# Patient Record
Sex: FEMALE | Race: OTHER | HISPANIC OR LATINO | Employment: FULL TIME | URBAN - METROPOLITAN AREA
[De-identification: names, ages, dates, MRNs, and addresses within clinical notes are randomized per-mention and may not be internally consistent; named-entity substitution may affect disease eponyms.]

---

## 2017-04-19 ENCOUNTER — ALLSCRIPTS OFFICE VISIT (OUTPATIENT)
Dept: OTHER | Facility: OTHER | Age: 42
End: 2017-04-19

## 2017-07-11 ENCOUNTER — OFFICE VISIT (OUTPATIENT)
Dept: URGENT CARE | Facility: MEDICAL CENTER | Age: 42
End: 2017-07-11
Payer: COMMERCIAL

## 2017-07-11 PROCEDURE — 99213 OFFICE O/P EST LOW 20 MIN: CPT

## 2017-07-14 ENCOUNTER — GENERIC CONVERSION - ENCOUNTER (OUTPATIENT)
Dept: OTHER | Facility: OTHER | Age: 42
End: 2017-07-14

## 2017-07-19 ENCOUNTER — GENERIC CONVERSION - ENCOUNTER (OUTPATIENT)
Dept: OTHER | Facility: OTHER | Age: 42
End: 2017-07-19

## 2017-08-25 ENCOUNTER — GENERIC CONVERSION - ENCOUNTER (OUTPATIENT)
Dept: OTHER | Facility: OTHER | Age: 42
End: 2017-08-25

## 2017-12-05 ENCOUNTER — ALLSCRIPTS OFFICE VISIT (OUTPATIENT)
Dept: OTHER | Facility: OTHER | Age: 42
End: 2017-12-05

## 2017-12-05 DIAGNOSIS — Z00.00 ENCOUNTER FOR GENERAL ADULT MEDICAL EXAMINATION WITHOUT ABNORMAL FINDINGS: ICD-10-CM

## 2017-12-06 NOTE — PROGRESS NOTES
Assessment    1  Gastritis (535 50) (K29 70)   2  Fever blister (054 9) (B00 1)   3  Encounter for preventive health examination (V70 0) (Z00 00)    Plan  Fever blister    · ValACYclovir HCl - 1 GM Oral Tablet; TAKE 2 TABLETS TWICE DAILY  Gastritis    · Omeprazole 20 MG Oral Capsule Delayed Release; Take one capsule daily  Health Maintenance    · (1) CBC/ PLT (NO DIFF); Status:Active; Requested for:65Ngw5248;    · (1) COMPREHENSIVE METABOLIC PANEL; Status:Active; Requested for:31Qmv8710;    · (1) LIPID PANEL, FASTING; Status:Active; Requested for:04Dmi9523;    · (1) TSH; Status:Active; Requested for:34Tfd7054;    · We recommend you modify your diet to achieve and maintain a healthy weight  Bertis Caul may increase your risk of developing health problems from vitamin andmineral deficiencies  We recommend a balanced diet rich in fruits and vegetables  Saranbrunarupal Hector also consider increasing your calorie intake by eating more frequently or addingnuts, avocados, and low-fat cheese or milk to your meals  Please let us knowif you would like to learn more about your nutrition and calorie needs, and additionaloptions to help you achieve your weight goals ; Status:Complete;   Done: 78Ygf418935:11PM    Discussion/Summary    I recommend diet and exercise  She should continue playing handball but also to some cross training and some weight-bearing exercises  Also recommend calcium and vitamin-D supplements  The patient was counseled regarding instructions for management,-- impressions  Chief Complaint  Patient presents today requesting refills on meds for her cold sores and stomach  No other concerns  Review of Systems   Constitutional: No fever, no chills, feels well, no tiredness, no recent weight gain or weight loss  Eyes: No complaints of eye pain, no red eyes, no eyesight problems, no discharge, no dry eyes, no itching of eyes    ENT: no complaints of earache, no loss of hearing, no nose bleeds, no nasal discharge, no sore throat, no hoarseness  Cardiovascular: No complaints of slow heart rate, no fast heart rate, no chest pain, no palpitations, no leg claudication, no lower extremity edema  Respiratory: No complaints of shortness of breath, no wheezing, no cough, no SOB on exertion, no orthopnea, no PND  Gastrointestinal: No complaints of abdominal pain, no constipation, no nausea or vomiting, no diarrhea, no bloody stools  Genitourinary: No complaints of dysuria, no incontinence, no pelvic pain, no dysmenorrhea, no vaginal discharge or bleeding  Musculoskeletal: No complaints of arthralgias, no myalgias, no joint swelling or stiffness, no limb pain or swelling  Integumentary: No complaints of skin rash or lesions, no itching, no skin wounds, no breast pain or lump  Neurological: No complaints of headache, no confusion, no convulsions, no numbness, no dizziness or fainting, no tingling, no limb weakness, no difficulty walking  Psychiatric: Not suicidal, no sleep disturbance, no anxiety or depression, no change in personality, no emotional problems  Endocrine: No complaints of proptosis, no hot flashes, no muscle weakness, no deepening of the voice, no feelings of weakness  Hematologic/Lymphatic: No complaints of swollen glands, no swollen glands in the neck, does not bleed easily, does not bruise easily  Active Problems  1  Asthma (493 90) (J45 909)   2  Chest pain (786 50) (R07 9)   3  Common migraine without aura (346 10) (G43 009)   4  Contact dermatitis (692 9) (L25 9)   5  Fever blister (054 9) (B00 1)   6  Ganglion cyst of joint of finger of right hand (727 41) (M67 441)   7  Gastritis (535 50) (K29 70)    Past Medical History  1  History of Summary Of Previous Pregnancies  3  (Total No )   2  History of Summary Of Previous Pregnancies Para 3  (Deliveries)   3  Urinary tract infection (599 0) (N39 0)    The active problems and past medical history were reviewed and updated today  Surgical History  1  History of Knee Surgery   2  History of Tubal Ligation    Family History  Father    1  Family history of Diabetes Mellitus (V18 0)  Maternal Grandmother    2  Family history of Breast Cancer (V16 3)  Family History    3  Family history of Asthma, chronic    Social History     · Drinking In Moderation (2 Drinks / Day Or Fewer)   · Marital History -    · Never A Smoker   · Denied: History of Physical Abuse (History)  The social history was reviewed and updated today  Current Meds   1  Diclofenac Sodium 75 MG Oral Tablet Delayed Release; TAKE 1 TABLET Twice daily with food; Therapy: 25Cpn7583 to (Evaluate:63Fdt9987)  Requested for: 64Jfv8775; Last Rx:46Mkl2172 Ordered   2  Omeprazole 20 MG Oral Capsule Delayed Release; Take one capsule daily; Therapy: 84FYG5181 to (Evaluate:39Ahq2792)  Requested for: 57MHU5731; Last Rx:64Dxl0050 Ordered   3  ValACYclovir HCl - 1 GM Oral Tablet; TAKE 2 TABLETS TWICE DAILY; Therapy: 58Lln4486 to (Evaluate:54Crq8805)  Requested for: 54Nuh0040; Last Rx:82Rxv9566 Ordered    The medication list was reviewed and updated today  Allergies  1  No Known Drug Allergies  2  Seasonal    Vitals  Vital Signs    Recorded: 10RKO4801 02:49PM   Temperature 98 4 F, Tympanic   Systolic 253, RUE, Sitting   Diastolic 74, RUE, Sitting   Height 5 ft 5 in   Weight 180 lb    BMI Calculated 29 95   BSA Calculated 1 89       Physical Exam   Constitutional  General appearance: No acute distress, well appearing and well nourished  Eyes  Conjunctiva and lids: No swelling, erythema or discharge  Pupils and irises: Equal, round and reactive to light  Ears, Nose, Mouth, and Throat  External inspection of ears and nose: Normal    Otoscopic examination: Tympanic membranes translucent with normal light reflex  Canals patent without erythema  Nasal mucosa, septum, and turbinates: Normal without edema or erythema     Oropharynx: Normal with no erythema, edema, exudate or lesions  Pulmonary  Respiratory effort: No increased work of breathing or signs of respiratory distress  Auscultation of lungs: Clear to auscultation  Cardiovascular  Palpation of heart: Normal PMI, no thrills  Auscultation of heart: Normal rate and rhythm, normal S1 and S2, without murmurs  Examination of extremities for edema and/or varicosities: Normal    Carotid pulses: Normal    Abdomen  Abdomen: Non-tender, no masses  Liver and spleen: No hepatomegaly or splenomegaly  Lymphatic  Palpation of lymph nodes in neck: No lymphadenopathy  Musculoskeletal  Gait and station: Normal    Digits and nails: Normal without clubbing or cyanosis  Inspection/palpation of joints, bones, and muscles: Normal    Skin  Skin and subcutaneous tissue: Normal without rashes or lesions  Neurologic  Cranial nerves: Cranial nerves 2-12 intact  Reflexes: 2+ and symmetric  Sensation: No sensory loss  Psychiatric  Orientation to person, place, and time: Normal    Mood and affect: Normal          Health Management  Encounter for preventive health examination   BREAST EXAM; every 1 year; Next Due: 12Jan2015; Overdue  PELVIC EXAM; every 1 year; Next Due: 12Jan2015;  Overdue    Signatures   Electronically signed by : Carol Graves DO; Dec  5 2017  3:12PM EST                       (Author)

## 2018-01-11 ENCOUNTER — ALLSCRIPTS OFFICE VISIT (OUTPATIENT)
Dept: OTHER | Facility: OTHER | Age: 43
End: 2018-01-11

## 2018-01-11 NOTE — PROGRESS NOTES
Assessment    1  Urinary tract infection (599 0) (N39 0)    Plan  Urinary tract infection    · Ciprofloxacin HCl - 250 MG Oral Tablet; one table twice daily   · Drink at least 6 glasses of clear liquids a day ; Status:Complete;   Done: 40CFY4343  11:06AM   · Call [phone] if: You have pain in the kidney or low back area ; Status:Complete;    Done: 61MDH2349 11:06AM   · (1) URINALYSIS (will reflex a microscopy if leukocytes, occult blood, protein or nitrites  are not within normal limits); Status:Active; Requested for:22Jan2016;    · (1) URINE CULTURE; Source:Urine, Clean Catch; Status:Active; Requested  for:22Jan2016;   UTI symptoms    · Follow Up if Not Better Evaluation and Treatment  Follow-up  Status: Complete  Done:  16JHW6616 11:04AM    Chief Complaint  PT PRESENTS FOR POSS UTI  PT STATES THERE IS BURNING, ODER, AND A PULLING FEELING WHEN VOIDING  History of Present Illness  HPI: Patient is here suprapubic discomfort, urine odor and dysuria over the past 3 days  No hematuria or back pain associated with this  Patient with some nausea but no vomiting  No fever noted  No medications used  Last menstrual cycle January 4 which was normal       Review of Systems    Constitutional: No fever, no chills, feels well, no tiredness, no recent weight gain or loss  ENT: no ear ache, no loss of hearing, no nosebleeds or nasal discharge, no sore throat or hoarseness  Cardiovascular: no complaints of slow or fast heart rate, no chest pain, no palpitations, no leg claudication or lower extremity edema  Respiratory: no complaints of shortness of breath, no wheezing, no dyspnea on exertion, no orthopnea or PND  Breasts: no complaints of breast pain, breast lump or nipple discharge  Gastrointestinal: no complaints of abdominal pain, no constipation, no nausea or diarrhea, no vomiting, no bloody stools  Genitourinary: as noted in HPI     Musculoskeletal: no complaints of arthralgia, no myalgia, no joint swelling or stiffness, no limb pain or swelling  Integumentary: no complaints of skin rash or lesion, no itching or dry skin, no skin wounds  Neurological: no complaints of headache, no confusion, no numbness or tingling, no dizziness or fainting  Active Problems    1  Asthma (493 90) (J45 909)   2  Backache (724 5) (M54 9)   3  Chest pain (786 50) (R07 9)   4  Common migraine without aura (346 10) (G43 009)   5  Dysfunctional uterine bleeding (626 8) (N93 8)   6  Encounter for routine gynecological examination with Papanicolaou smear of cervix   (V72 31,V76 2) (Z01 419,Z12 4)   7  Encounter for screening for malignant neoplasm of cervix (V76 2) (Z12 4)   8  Gastritis (535 50) (K29 70)   9  Heart burn (787 1) (R12)   10  Lumbar Sprain (847 2)   11  Lumbar Strain (847 2)   12  Motion sickness (994 6) (T75 3XXA)   13  Near syncope (780 2) (R55)   14  Somatic dysfunction of lumbar region (739 3) (M99 03)   15  Trapezius muscle spasm (728 85) (E71 793)    Past Medical History    1  History of Summary Of Previous Pregnancies  3  (Total No )   2  History of Summary Of Previous Pregnancies Para 3  (Deliveries)    Family History    1  Family history of Diabetes Mellitus (V18 0)    2  Family history of Breast Cancer (V16 3)    3  Family history of Asthma, chronic    Social History    · Drinking In Moderation (2 Drinks / Day Or Fewer)   · Marital History -    · Never A Smoker   · Denied: History of Physical Abuse (History)    Surgical History    1  History of Knee Surgery   2  History of Knee Surgery   3  History of Tubal Ligation    Current Meds   1  Cyclobenzaprine HCl - 5 MG Oral Tablet; TAKE 1 TABLET 3 times daily; Therapy: 67Rfl2555 to (Evaluate:2014)  Requested for: 2014; Last   Rx:2014 Ordered   2  Naproxen TABS; Therapy: (Recorded:2015) to Recorded   3  Omeprazole 20 MG Oral Capsule Delayed Release; Take one capsule daily;    Therapy: 50UZW8233 to (Evaluate:2016) Requested for: 12Jan2015; Last   Rx:12Jan2015 Ordered   4  PredniSONE 50 MG Oral Tablet; TAKE 1 TABLET DAILY WITH FOOD; Therapy: 06Grs8073 to (Evaluate:08Sep2014)  Requested for: 48Joa2363; Last   Rx:03Sep2014 Ordered   5  ProAir  (90 Base) MCG/ACT Inhalation Aerosol Solution; INHALE 1 TO 2 PUFFS   EVERY 4 TO 6 HOURS AS NEEDED; Therapy: 89TQR7112 to (Evaluate:12Apr2015)  Requested for: 53XTR0162; Last   Rx:12Jan2015 Ordered    Allergies    1  No Known Drug Allergies    2  Seasonal    Vitals   Recorded: 96YGR3448 10:54AM   Temperature 41 8 F    Systolic 032, LUE, Sitting    Diastolic 60, LUE, Sitting    Patient Refused Weight No No     Physical Exam    Constitutional   General appearance: No acute distress, well appearing and well nourished  Eyes   Conjunctiva and lids: No swelling, erythema or discharge  Pupils and irises: Equal, round and reactive to light  Ears, Nose, Mouth, and Throat   External inspection of ears and nose: Normal     Otoscopic examination: Tympanic membranes translucent with normal light reflex  Canals patent without erythema  Nasal mucosa, septum, and turbinates: Normal without edema or erythema  Oropharynx: Normal with no erythema, edema, exudate or lesions  Pulmonary   Respiratory effort: No increased work of breathing or signs of respiratory distress  Auscultation of lungs: Clear to auscultation  Cardiovascular   Palpation of heart: Normal PMI, no thrills  Auscultation of heart: Normal rate and rhythm, normal S1 and S2, without murmurs  Examination of extremities for edema and/or varicosities: Normal     Carotid pulses: Normal     Abdomen   Abdomen: Abnormal   Mild suprapubic discomfort with palpation  Abdomen otherwise soft and nontender  Bowel sounds present  No back pain  Liver and spleen: No hepatomegaly or splenomegaly  Lymphatic   Palpation of lymph nodes in neck: No lymphadenopathy      Musculoskeletal   Gait and station: Normal     Digits and nails: Normal without clubbing or cyanosis  Inspection/palpation of joints, bones, and muscles: Normal     Skin   Skin and subcutaneous tissue: Normal without rashes or lesions  Neurologic   Cranial nerves: Cranial nerves 2-12 intact  Reflexes: 2+ and symmetric  Sensation: No sensory loss      Psychiatric   Orientation to person, place, and time: Normal     Mood and affect: Normal          Results/Data  Urine Dip Non-Automated- POC 68GSA8895 11:02AM [facility]     Test Name Result Flag Reference   Color Yellow     Clarity Cloudy     Leukocytes +     Nitrite NEG     Blood NEG     Bilirubin NEG     Urobilinogen NEG     Protein +30     Ph 5     Specific Gravity 1 010     Ketone NEG     Glucose NORMAL         Signatures   Electronically signed by : Sharee Peoples DO; Jan 22 2016 11:08AM EST                       (Author)

## 2018-01-12 NOTE — PROGRESS NOTES
Assessment   1  Asthma (493 90) (J45 909)   2  History of chest pain (V13 89) (Z87 898)   3  Fever blister (054 9) (B00 1)   4  History of Common migraine without aura (346 10) (G43 009)    Discussion/Summary      Continue current therapy  I will call with the lab test results  Follow-up in 6 months or as needed  The patient was counseled regarding instructions for management,-- impressions,-- importance of compliance with treatment  Chief Complaint   PT here for follow up for medication check  PT is overdue for OBGYN  PT reports no signs of depression  History of Present Illness   She is feeling well  She started diet and exercise and and working out  Review of Systems        Constitutional: No fever, no chills, feels well, no tiredness, no recent weight gain or weight loss  Eyes: No complaints of eye pain, no red eyes, no eyesight problems, no discharge, no dry eyes, no itching of eyes  ENT: no complaints of earache, no loss of hearing, no nose bleeds, no nasal discharge, no sore throat, no hoarseness  Cardiovascular: No complaints of slow heart rate, no fast heart rate, no chest pain, no palpitations, no leg claudication, no lower extremity edema  Respiratory: No complaints of shortness of breath, no wheezing, no cough, no SOB on exertion, no orthopnea, no PND  Gastrointestinal: No complaints of abdominal pain, no constipation, no nausea or vomiting, no diarrhea, no bloody stools  Genitourinary: No complaints of dysuria, no incontinence, no pelvic pain, no dysmenorrhea, no vaginal discharge or bleeding  Musculoskeletal: No complaints of arthralgias, no myalgias, no joint swelling or stiffness, no limb pain or swelling  Integumentary: No complaints of skin rash or lesions, no itching, no skin wounds, no breast pain or lump        Neurological: No complaints of headache, no confusion, no convulsions, no numbness, no dizziness or fainting, no tingling, no limb weakness, no difficulty walking  Psychiatric: Not suicidal, no sleep disturbance, no anxiety or depression, no change in personality, no emotional problems  Endocrine: No complaints of proptosis, no hot flashes, no muscle weakness, no deepening of the voice, no feelings of weakness  Hematologic/Lymphatic: No complaints of swollen glands, no swollen glands in the neck, does not bleed easily, does not bruise easily  Active Problems   1  Asthma (493 90) (J45 909)   2  Fever blister (054 9) (B00 1)    Past Medical History   1  History of Summary Of Previous Pregnancies  3  (Total No )   2  History of Summary Of Previous Pregnancies Para 3  (Deliveries)   3  Urinary tract infection (599 0) (N39 0)     The active problems and past medical history were reviewed and updated today  Surgical History   1  History of Knee Surgery   2  History of Tubal Ligation     The surgical history was reviewed and updated today  Family History   Father    1  Family history of Diabetes Mellitus (V18 0)  Maternal Grandmother    2  Family history of Breast Cancer (V16 3)  Family History    3  Family history of Asthma, chronic     The family history was reviewed and updated today  Social History    · Drinking In Moderation (2 Drinks / Day Or Fewer)   · Marital History -    · Never A Smoker   · Denied: History of Physical Abuse (History)  The social history was reviewed and updated today  Current Meds    1  Diclofenac Sodium 75 MG Oral Tablet Delayed Release; TAKE 1 TABLET Twice daily with     food; Therapy: 84Pfz4353 to (Evaluate:41Xjm0000)  Requested for: 2017; Last     Rx:2017 Ordered   2  Omeprazole 20 MG Oral Capsule Delayed Release; Take one capsule daily; Therapy: 94NEC0614 to (Xavi Alcantara)  Requested for: 29JGR9597; Last     Rx:35Ffr0255 Ordered   3  ValACYclovir HCl - 1 GM Oral Tablet; TAKE 2 TABLETS TWICE DAILY;      Therapy: 61Iim6235 to (Evaluate:13Lrt7941)  Requested for: 55YUG2655; Last     Rx:15Idh6558 Ordered     The medication list was reviewed and updated today  Allergies   1  No Known Drug Allergies  2  Seasonal    Vitals   Vital Signs    Recorded: 44GOG5806 10:04AM   Heart Rate 72, R Radial   Respiration 16   Systolic 431, LUE, Sitting   Diastolic 68, LUE, Sitting   Height 5 ft 5 in   Weight 175 lb    BMI Calculated 29 12   BSA Calculated 1 87     Physical Exam        Constitutional      General appearance: No acute distress, well appearing and well nourished  Eyes      Conjunctiva and lids: No swelling, erythema or discharge  Pupils and irises: Equal, round and reactive to light  Ears, Nose, Mouth, and Throat      External inspection of ears and nose: Normal        Otoscopic examination: Tympanic membranes translucent with normal light reflex  Canals patent without erythema  Nasal mucosa, septum, and turbinates: Normal without edema or erythema  Oropharynx: Normal with no erythema, edema, exudate or lesions  Pulmonary      Respiratory effort: No increased work of breathing or signs of respiratory distress  Auscultation of lungs: Clear to auscultation  Cardiovascular      Palpation of heart: Normal PMI, no thrills  Auscultation of heart: Normal rate and rhythm, normal S1 and S2, without murmurs  Examination of extremities for edema and/or varicosities: Normal        Carotid pulses: Normal        Abdomen      Abdomen: Non-tender, no masses  Liver and spleen: No hepatomegaly or splenomegaly  Lymphatic      Palpation of lymph nodes in neck: No lymphadenopathy  Musculoskeletal      Gait and station: Normal        Digits and nails: Normal without clubbing or cyanosis  Inspection/palpation of joints, bones, and muscles: Normal        Skin      Skin and subcutaneous tissue: Normal without rashes or lesions         Neurologic      Cranial nerves: Cranial nerves 2-12 intact  Reflexes: 2+ and symmetric  Sensation: No sensory loss  Psychiatric      Orientation to person, place, and time: Normal        Mood and affect: Normal           Results/Data   PHQ-2 Adult Depression Screening 18EEI9942 10:10AM User, [facility]      Test Name Result Flag Reference   PHQ-2 Adult Depression Score 0     Over the last two weeks, how often have you been bothered by any of the following problems? Little interest or pleasure in doing things: Not at all - 0     Feeling down, depressed, or hopeless: Not at all - 0   PHQ-2 Adult Depression Screening Negative          Health Management   Encounter for preventive health examination   BREAST EXAM; every 1 year; Next Due: 12Jan2015; Overdue  PELVIC EXAM; every 1 year; Next Due: 12Jan2015;  Overdue    Signatures    Electronically signed by : Erica Valdes DO; Jan 11 2018 10:20AM EST                       (Author)

## 2018-01-14 VITALS
WEIGHT: 172.25 LBS | HEIGHT: 65 IN | BODY MASS INDEX: 28.7 KG/M2 | DIASTOLIC BLOOD PRESSURE: 70 MMHG | SYSTOLIC BLOOD PRESSURE: 140 MMHG

## 2018-01-22 VITALS
WEIGHT: 175 LBS | SYSTOLIC BLOOD PRESSURE: 120 MMHG | HEART RATE: 72 BPM | BODY MASS INDEX: 29.16 KG/M2 | DIASTOLIC BLOOD PRESSURE: 68 MMHG | RESPIRATION RATE: 16 BRPM | HEIGHT: 65 IN

## 2018-01-22 DIAGNOSIS — Z12.31 ENCOUNTER FOR SCREENING MAMMOGRAM FOR MALIGNANT NEOPLASM OF BREAST: ICD-10-CM

## 2018-01-23 ENCOUNTER — LAB REQUISITION (OUTPATIENT)
Dept: LAB | Facility: HOSPITAL | Age: 43
End: 2018-01-23
Payer: COMMERCIAL

## 2018-01-23 ENCOUNTER — ALLSCRIPTS OFFICE VISIT (OUTPATIENT)
Dept: OTHER | Facility: OTHER | Age: 43
End: 2018-01-23

## 2018-01-23 VITALS
WEIGHT: 180 LBS | BODY MASS INDEX: 29.99 KG/M2 | SYSTOLIC BLOOD PRESSURE: 120 MMHG | HEIGHT: 65 IN | DIASTOLIC BLOOD PRESSURE: 74 MMHG | TEMPERATURE: 98.4 F

## 2018-01-23 DIAGNOSIS — Z01.419 ENCOUNTER FOR GYNECOLOGICAL EXAMINATION WITHOUT ABNORMAL FINDING: ICD-10-CM

## 2018-01-23 PROCEDURE — G0145 SCR C/V CYTO,THINLAYER,RESCR: HCPCS | Performed by: NURSE PRACTITIONER

## 2018-01-23 PROCEDURE — 87624 HPV HI-RISK TYP POOLED RSLT: CPT | Performed by: NURSE PRACTITIONER

## 2018-01-24 NOTE — PROGRESS NOTES
Assessment   1  Encounter for gynecological examination with Papanicolaou smear of cervix (V72 31)     (Z01 419)    Plan   Encounter for gynecological examination with Papanicolaou smear of cervix    · (1) THIN PREP PAP WITH IMAGING; Status: In Progress - Specimen/Data    Collected,Retrospective Authorization;   Done: 01AFR4411   Perform:[facility] In Office Collection; Order Comments:cervical and endocervical pap smearCotest HPV regardless genotype 16 and 18; DUY:[id number]; Last Updated Gudelia Tai; 1/23/2018 12:05:51 PM;Ordered;For:Encounter for gynecological examination with Papanicolaou smear of cervix; Ordered By:Sameer Huerta; Maturation index required? : No  HPV? : Regardless of Interpretation   · Follow-up visit in 1 year Evaluation and Treatment  Follow-up  Status: Hold For -    Scheduling  Requested for: 66JSJ0094   Ordered; For: Encounter for gynecological examination with Papanicolaou smear of cervix; Ordered By: Jessa Epstein Performed:  Due: 30JJH7135  Encounter for screening mammogram for malignant neoplasm of breast    · MAMMO SCREENING BILATERAL W 3D & CAD; Status:Hold For - Scheduling; Requested    for:22Jan2018; Perform:[facility]; SSQ:[id number];GSOPTLA;WJY:IJHDVITQV for screening mammogram for malignant neoplasm of breast; Ordered By:Sweta Huerta;    Discussion/Summary   healthy adult female Currently, she eats an adequate diet and has an adequate exercise regimen  the risks and benefits of cervical cancer screening were discussed HPV and Pap Co-testing Done Today Breast cancer screening: the risks and benefits of breast cancer screening were discussed, self breast exam technique was taught, monthly self breast exam was advised, mammogram has been ordered and The pt  is aqware that MMG screening should begin at age 36  Colorectal cancer screening: colorectal cancer screening is not indicated  Osteoporosis screening: bone mineral density testing is not indicated  Advice and education were given regarding nutrition, weight bearing exercise, reproductive health and contraception  The patient has the current Goals: To remain active and healthy  The patent has the current Barriers: None  Patient is able to Self-Care  Chief Complaint   Patient presents today for her yearly exam       History of Present Illness   HPI: The pt  denies having any current GYN problems  GYN HM, Adult Female [facility]: The patient is being seen for a health maintenance and gynecology evaluation  The last health maintenance visit was 22 month(s) ago  General Health: The patient's health since the last visit is described as good  Lifestyle:  She consumes a diverse and healthy diet  -- She exercises regularly  -- She does not use tobacco       Reproductive health:  she reports no menstrual problems  Menstrual history: The cycles have been regular  The duration of her recent periods has been regular  -- she uses contraception  For contraception, she has had a tubal occlusion  -- she is sexually active  Screening: cancer screening reviewed and current  Review of Systems        Constitutional: No fever, no chills, feels well, no tiredness, no recent weight gain or loss  Cardiovascular: no complaints of slow or fast heart rate, no chest pain, no palpitations, no leg claudication or lower extremity edema  Respiratory: no complaints of shortness of breath, no wheezing, no dyspnea on exertion, no orthopnea or PND  Breasts: no complaints of breast pain, breast lump or nipple discharge  Gastrointestinal: no complaints of abdominal pain, no constipation, no nausea or diarrhea, no vomiting, no bloody stools  Genitourinary: no complaints of dysuria, no incontinence, no pelvic pain, no dysmenorrhea, no vaginal discharge or abnormal vaginal bleeding  Integumentary: no complaints of skin rash or lesion, no itching or dry skin, no skin wounds        Neurological: no complaints of headache, no confusion, no numbness or tingling, no dizziness or fainting  Active Problems   1  Asthma (493 90) (J45 909)   2  Encounter for gynecological examination with Papanicolaou smear of cervix (V72 31)     (Z01 419)   3  Encounter for screening mammogram for malignant neoplasm of breast (V76 12)     (Z12 31)   4  Fever blister (054 9) (B00 1)    Past Medical History    · History of Summary Of Previous Pregnancies  3  (Total No )   · History of Summary Of Previous Pregnancies Para 3  (Deliveries)   · Urinary tract infection (599 0) (N39 0)     The active problems and past medical history were reviewed and updated today  Surgical History    · History of Knee Surgery   · History of Tubal Ligation     The surgical history was reviewed and updated today  Family History   Father    · Family history of Diabetes Mellitus (V18 0)  Maternal Grandmother    · Family history of Breast Cancer (V16 3)  Family History    · Family history of Asthma, chronic     The family history was reviewed and updated today  Social History    · Drinking In Moderation (2 Drinks / Day Or Fewer)   · Marital History -    · Never A Smoker   · Denied: History of Physical Abuse (History)  The social history was reviewed and is unchanged  Current Meds    1  Diclofenac Sodium 75 MG Oral Tablet Delayed Release; TAKE 1 TABLET Twice daily with     food; Therapy: 39Bqk1377 to (Evaluate:65Phk4974)  Requested for: 17Kds9886; Last     Rx:[id number] Ordered   2  Omeprazole 20 MG Oral Capsule Delayed Release; Take one capsule daily; Therapy: 40RTL4669 to (Denise Pride)  Requested for: 48GFB7919; Last     Rx:48Hmq8402 Ordered   3  ValACYclovir HCl - 1 GM Oral Tablet; TAKE 2 TABLETS TWICE DAILY; Therapy: 59Qrl0708 to (Evaluate:78Sdp0918)  Requested for: 98BWI7362; Last     Rx:21Ngi4329 Ordered    Allergies   1  No Known Drug Allergies  2   Seasonal    Vitals    Recorded: 26ROF7640 11: 92PC   Systolic 693   Diastolic 66   Height 5 ft 5 in   Weight 178 lb    BMI Calculated 29 62   BSA Calculated 1 88   LMP [id number]     Physical Exam        Constitutional      General appearance: No acute distress, well appearing and well nourished  Neck      Neck: Normal, supple, trachea midline, no masses  Thyroid: Normal, no thyromegaly  Pulmonary      Respiratory effort: No increased work of breathing or signs of respiratory distress  Auscultation of lungs: Clear to auscultation  Cardiovascular      Auscultation of heart: Normal rate and rhythm, normal S1 and S2, no murmurs  Genitourinary      External genitalia: Normal and no lesions appreciated  Vagina: Normal, no lesions or dryness appreciated  Urethra: Normal        Urethral meatus: Normal        Bladder: Normal, soft, non-tender and no prolapse or masses appreciated  Cervix: Normal, no palpable masses  A Pap smear was performed  Uterus: Normal, non-tender, not enlarged, and no palpable masses  Adnexa/parametria: Normal, non-tender and no fullness or masses appreciated  Chest      Breasts: Normal and no dimpling or skin changes noted  Abdomen      Abdomen: Normal, non-tender, and no organomegaly noted  Liver and spleen: No hepatomegaly or splenomegaly  Examination for hernias: No hernias appreciated  Lymphatic      Palpation of lymph nodes in neck, axillae, groin and/or other locations: No lymphadenopathy or masses noted  Skin      Skin and subcutaneous tissue: Normal skin turgor and no rashes         Psychiatric      Orientation to person, place, and time: Normal        Mood and affect: Normal        Signatures    Electronically signed by : Марина Ocampo; Jan 23 2018  2:28PM EST                       (Author)     Electronically signed by : Mynor Yancey DO; Jan 23 2018  6:59PM EST

## 2018-01-26 ENCOUNTER — OFFICE VISIT (OUTPATIENT)
Dept: FAMILY MEDICINE CLINIC | Facility: CLINIC | Age: 43
End: 2018-01-26
Payer: COMMERCIAL

## 2018-01-26 VITALS
WEIGHT: 175 LBS | SYSTOLIC BLOOD PRESSURE: 130 MMHG | BODY MASS INDEX: 28.12 KG/M2 | DIASTOLIC BLOOD PRESSURE: 80 MMHG | TEMPERATURE: 99 F | HEIGHT: 66 IN

## 2018-01-26 DIAGNOSIS — J01.00 ACUTE NON-RECURRENT MAXILLARY SINUSITIS: Primary | ICD-10-CM

## 2018-01-26 LAB — HPV RRNA GENITAL QL NAA+PROBE: NORMAL

## 2018-01-26 PROCEDURE — 99213 OFFICE O/P EST LOW 20 MIN: CPT | Performed by: FAMILY MEDICINE

## 2018-01-26 RX ORDER — FLUTICASONE PROPIONATE 50 MCG
1 SPRAY, SUSPENSION (ML) NASAL 2 TIMES DAILY
Qty: 16 G | Refills: 0 | Status: SHIPPED | OUTPATIENT
Start: 2018-01-26 | End: 2018-06-21 | Stop reason: SDUPTHER

## 2018-01-26 RX ORDER — DICLOFENAC SODIUM 75 MG/1
1 TABLET, DELAYED RELEASE ORAL AS NEEDED
COMMUNITY
Start: 2017-04-19 | End: 2019-01-16 | Stop reason: ALTCHOICE

## 2018-01-26 RX ORDER — AMOXICILLIN 500 MG/1
500 CAPSULE ORAL EVERY 8 HOURS SCHEDULED
Qty: 30 CAPSULE | Refills: 0 | Status: SHIPPED | OUTPATIENT
Start: 2018-01-26 | End: 2018-02-05

## 2018-01-26 RX ORDER — VALACYCLOVIR HYDROCHLORIDE 1 G/1
2 TABLET, FILM COATED ORAL AS NEEDED
COMMUNITY
Start: 2016-08-29 | End: 2019-01-31 | Stop reason: SDUPTHER

## 2018-01-26 RX ORDER — OMEPRAZOLE 20 MG/1
1 CAPSULE, DELAYED RELEASE ORAL DAILY
COMMUNITY
Start: 2015-01-12 | End: 2019-08-06 | Stop reason: SDUPTHER

## 2018-01-26 NOTE — PROGRESS NOTES
Assessment/Plan:    No problem-specific Assessment & Plan notes found for this encounter  Diagnoses and all orders for this visit:    Acute non-recurrent maxillary sinusitis  -     amoxicillin (AMOXIL) 500 mg capsule; Take 1 capsule (500 mg total) by mouth every 8 (eight) hours for 10 days  -     fluticasone (FLONASE) 50 mcg/act nasal spray; 1 spray into each nostril 2 (two) times a day          Subjective:   Chief Complaint   Patient presents with    Cold Like Symptoms     congestion, sneezing, cough, pnd, chills x 3-4 days  tried nyquil/dayquil  headache and occasional dizziness  Teeth on top and rt side of jaw painful  Fever was present but broke yesterday  Patient ID: Catrachita Oneal is a 38 y o  female  Patient is here with cough, congestion, postnasal drip, right maxillary teeth hurting over the past 4 days  Patient also with sneezing and chills  Patient did have a fever which broke  Patient also with headache and dizziness  Patient did try NyQuil and DayQuil  The following portions of the patient's history were reviewed and updated as appropriate: allergies, current medications, past family history, past medical history, past social history, past surgical history and problem list     Review of Systems   Constitutional: Negative  HENT: Positive for congestion, ear pain, postnasal drip, rhinorrhea, sinus pain, sinus pressure and sneezing  Negative for facial swelling and sore throat  Eyes: Negative  Respiratory: Negative  Cardiovascular: Negative  Gastrointestinal: Negative  Endocrine: Negative  Genitourinary: Negative  Musculoskeletal: Negative  Skin: Negative  Allergic/Immunologic: Negative  Neurological: Negative  Hematological: Negative  Psychiatric/Behavioral: Negative  Objective:     Physical Exam   Constitutional: She is oriented to person, place, and time  She appears well-developed and well-nourished  No distress     HENT:   Head: Normocephalic and atraumatic  Right Ear: External ear normal    Left Ear: External ear normal    Eyes: Conjunctivae and EOM are normal  Pupils are equal, round, and reactive to light  Right eye exhibits no discharge  Left eye exhibits no discharge  No scleral icterus  Neck: Normal range of motion  Neck supple  No thyromegaly present  Cardiovascular: Normal rate, regular rhythm, normal heart sounds and intact distal pulses  Exam reveals no gallop and no friction rub  No murmur heard  Pulmonary/Chest: Effort normal and breath sounds normal  No respiratory distress  She has no wheezes  She has no rales  She exhibits no tenderness  Abdominal: Soft  Bowel sounds are normal  She exhibits no distension  There is no tenderness  There is no rebound and no guarding  Musculoskeletal: Normal range of motion  She exhibits no edema or tenderness  Lymphadenopathy:     She has no cervical adenopathy  Neurological: She is oriented to person, place, and time  No cranial nerve deficit  She exhibits normal muscle tone  Coordination normal    Skin: Skin is warm and dry  No rash noted  She is not diaphoretic  No erythema  No pallor  Psychiatric: She has a normal mood and affect  Her behavior is normal  Judgment and thought content normal    Nursing note and vitals reviewed

## 2018-01-29 LAB
LAB AP GYN PRIMARY INTERPRETATION: NORMAL
Lab: NORMAL

## 2018-02-13 ENCOUNTER — HOSPITAL ENCOUNTER (OUTPATIENT)
Dept: MAMMOGRAPHY | Facility: MEDICAL CENTER | Age: 43
Discharge: HOME | End: 2018-02-13
Payer: COMMERCIAL

## 2018-02-13 DIAGNOSIS — Z12.31 ENCOUNTER FOR SCREENING MAMMOGRAM FOR MALIGNANT NEOPLASM OF BREAST: ICD-10-CM

## 2018-02-13 PROCEDURE — 77067 SCR MAMMO BI INCL CAD: CPT

## 2018-02-13 PROCEDURE — 77063 BREAST TOMOSYNTHESIS BI: CPT

## 2018-02-26 NOTE — PROGRESS NOTES
Assessment    1  Asthma (493 90) (J45 909)   2  History of chest pain (V13 89) (Z87 898)   3  Fever blister (054 9) (B00 1)   4  History of Common migraine without aura (346 10) (G43 009)   5  Encounter for preventive health examination (V70 0) (Z00 00)    Plan  Health Maintenance    · Call [phone] if: You find a new or different kind of lump in your breast ;  Status:Complete;   Done: 13ORU2210 10:25AM   · We encourage all of our patients to exercise regularly  30 minutes of exercise or physical  activity five or more days a week is recommended for children and adults ;  Status:Complete;   Done: 12MFC9676 10:25AM    Discussion/Summary  health maintenance visit      Chief Complaint  PHYS  History of Present Illness  HM, Adult Female: The patient is being seen for a health maintenance evaluation  General Health: The patient's health since the last visit is described as good  Lifestyle:  She consumes a diverse and healthy diet  She has weight concerns  She exercises regularly  She does not use tobacco  She denies alcohol use  She denies drug use  Screening:      Review of Systems    Constitutional: No fever, no chills, feels well, no tiredness, no recent weight gain or weight loss  Eyes: No complaints of eye pain, no red eyes, no eyesight problems, no discharge, no dry eyes, no itching of eyes  ENT: no complaints of earache, no loss of hearing, no nose bleeds, no nasal discharge, no sore throat, no hoarseness  Cardiovascular: No complaints of slow heart rate, no fast heart rate, no chest pain, no palpitations, no leg claudication, no lower extremity edema  Respiratory: No complaints of shortness of breath, no wheezing, no cough, no SOB on exertion, no orthopnea, no PND  Gastrointestinal: No complaints of abdominal pain, no constipation, no nausea or vomiting, no diarrhea, no bloody stools     Genitourinary: No complaints of dysuria, no incontinence, no pelvic pain, no dysmenorrhea, no vaginal discharge or bleeding  Musculoskeletal: No complaints of arthralgias, no myalgias, no joint swelling or stiffness, no limb pain or swelling  Integumentary: No complaints of skin rash or lesions, no itching, no skin wounds, no breast pain or lump  Neurological: No complaints of headache, no confusion, no convulsions, no numbness, no dizziness or fainting, no tingling, no limb weakness, no difficulty walking  Psychiatric: Not suicidal, no sleep disturbance, no anxiety or depression, no change in personality, no emotional problems  Endocrine: No complaints of proptosis, no hot flashes, no muscle weakness, no deepening of the voice, no feelings of weakness  Hematologic/Lymphatic: No complaints of swollen glands, no swollen glands in the neck, does not bleed easily, does not bruise easily  Active Problems    1  Asthma (493 90) (J45 909)   2  Fever blister (054 9) (B00 1)    Past Medical History    · History of Summary Of Previous Pregnancies  3  (Total No )   · History of Summary Of Previous Pregnancies Para 3  (Deliveries)   · Urinary tract infection (599 0) (N39 0)    Surgical History    · History of Knee Surgery   · History of Tubal Ligation    Family History  Father    · Family history of Diabetes Mellitus (V18 0)  Maternal Grandmother    · Family history of Breast Cancer (V16 3)  Family History    · Family history of Asthma, chronic    Social History    · Drinking In Moderation (2 Drinks / Day Or Fewer)   · Marital History -    · Never A Smoker   · Denied: History of Physical Abuse (History)    Current Meds   1  Diclofenac Sodium 75 MG Oral Tablet Delayed Release; TAKE 1 TABLET Twice daily with   food; Therapy: 60Usr8085 to (Evaluate:70Abm8870)  Requested for: 2017; Last   Rx:2017 Ordered   2  Omeprazole 20 MG Oral Capsule Delayed Release; Take one capsule daily;    Therapy: 49JML0227 to (Ashley Lara)  Requested for: 06OKE5458; Last   Rx:42Dmy1326 Ordered 3  ValACYclovir HCl - 1 GM Oral Tablet; TAKE 2 TABLETS TWICE DAILY; Therapy: 03Bqj1136 to (Evaluate:26Jcf2307)  Requested for: 54DWE1466; Last   Rx:91Nbf5192 Ordered    Allergies    1  No Known Drug Allergies    2  Seasonal    Vitals   Recorded: 33JPE1302 10:04AM   Heart Rate 72, R Radial   Respiration 16   Systolic 247 mm Hg, LUE, Sitting   Diastolic 68 mm Hg, LUE, Sitting   Height 5 ft 5 in   Weight 175 lb    BMI Calculated 29 12 kg/m2   BSA Calculated 1 87 m2     Physical Exam    Constitutional   General appearance: No acute distress, well appearing and well nourished  Eyes   Conjunctiva and lids: No swelling, erythema or discharge  Pupils and irises: Equal, round and reactive to light  Ears, Nose, Mouth, and Throat   External inspection of ears and nose: Normal     Otoscopic examination: Tympanic membranes translucent with normal light reflex  Canals patent without erythema  Oropharynx: Normal with no erythema, edema, exudate or lesions  Pulmonary   Respiratory effort: No increased work of breathing or signs of respiratory distress  Auscultation of lungs: Clear to auscultation  Cardiovascular   Palpation of heart: Normal PMI, no thrills  Auscultation of heart: Normal rate and rhythm, normal S1 and S2, without murmurs  Examination of extremities for edema and/or varicosities: Normal     Abdomen   Abdomen: Non-tender, no masses  Liver and spleen: No hepatomegaly or splenomegaly  Lymphatic   Palpation of lymph nodes in neck: No lymphadenopathy  Musculoskeletal   Gait and station: Normal     Digits and nails: Normal without clubbing or cyanosis  Inspection/palpation of joints, bones, and muscles: Normal     Skin   Skin and subcutaneous tissue: Normal without rashes or lesions  Neurologic   Cranial nerves: Cranial nerves 2-12 intact  Reflexes: 2+ and symmetric  Sensation: No sensory loss      Psychiatric   Orientation to person, place, and time: Normal     Mood and affect: Normal        Results/Data  PHQ-2 Adult Depression Screening 22QHY3501 10:10AM User, Ahs     Test Name Result Flag Reference   PHQ-2 Adult Depression Score 0     Over the last two weeks, how often have you been bothered by any of the following problems? Little interest or pleasure in doing things: Not at all - 0  Feeling down, depressed, or hopeless: Not at all - 0   PHQ-2 Adult Depression Screening Negative         Health Management  Encounter for preventive health examination   BREAST EXAM; every 1 year; Next Due: 12Jan2015; Overdue  PELVIC EXAM; every 1 year; Next Due: 12Jan2015;  Overdue    Signatures   Electronically signed by : Juanjose Ochoa DO; Jan 11 2018 10:25AM EST                       (Author)

## 2018-03-07 NOTE — PROGRESS NOTES
History of Present Illness    Revaccination   Vaccine Information: Vaccine(s) Given (names): Adacel  Unable to reach by phone  Spoke with regarding vaccine out of temperature range  Action(s): Appointment scheduled: [id number]  Pt called (attempt 1): [phone] [id number] JT  Pt called (attempt 2): [id number] JT  Pt called (attempt 3): [id number] 9316 JT  Letter Sent (Regular and Certified): [id number] RG   Revaccination Completed: [id number]  Active Problems    1  Asthma (493 90) (J45 909)   2  Backache (724 5) (M54 9)   3  Chest pain (786 50) (R07 9)   4  Common migraine without aura (346 10) (G43 009)   5  Contact dermatitis (692 9) (L25 9)   6  Dysfunctional uterine bleeding (626 8) (N93 8)   7  Encounter for routine gynecological examination (V72 31) (Z01 419)   8  Encounter for routine gynecological examination with Papanicolaou smear of cervix   (V72 31,V76 2) (Z01 419)   9  Encounter for screening for malignant neoplasm of cervix (V76 2) (Z12 4)   10  Fever blister (054 9) (B00 1)   11  Gastritis (535 50) (K29 70)   12  Heart burn (787 1) (R12)   13  Lumbar Sprain (847 2)   14  Lumbar Strain (847 2)   15  Motion sickness (994 6) (T75 3XXA)   16  Near syncope (780 2) (R55)   17  Need for revaccination (V05 9) (Z23)   18  Somatic dysfunction of lumbar region (739 3) (M99 03)   19  Trapezius muscle spasm (728 85) (M62 838)   20  UTI symptoms (788 99) (R39 9)    Immunizations  Tdap --- [place]: Permanently Deferred: Parent/Guardian Refuses, pt did not call office back for  rvac, 68MVX6457; Series2: 12-Jan-2015   Tubersol 5 UNIT/0 1ML Intradermal Solution --- [place]: 10-Mar-2015     Current Meds   1  ValACYclovir HCl - 1 GM Oral Tablet; TAKE 2 TABLETS TWICE DAILY    Allergies    1  No Known Drug Allergies    2  Seasonal    Signatures   Electronically signed by : Juanjose Ochoa DO; Feb 28 2017  2:21PM EST                       (Author)    Electronically signed by : Nathalie Marin OM;  Apr  3 2017 10:38AM EST                       (Author)

## 2018-03-15 ENCOUNTER — OFFICE VISIT (OUTPATIENT)
Dept: URGENT CARE | Facility: MEDICAL CENTER | Age: 43
End: 2018-03-15
Payer: COMMERCIAL

## 2018-03-15 VITALS
HEIGHT: 66 IN | BODY MASS INDEX: 28.12 KG/M2 | TEMPERATURE: 97.7 F | HEART RATE: 68 BPM | SYSTOLIC BLOOD PRESSURE: 122 MMHG | WEIGHT: 175 LBS | RESPIRATION RATE: 18 BRPM | DIASTOLIC BLOOD PRESSURE: 78 MMHG | OXYGEN SATURATION: 100 %

## 2018-03-15 DIAGNOSIS — S23.3XXA SPRAIN OF LIGAMENTS OF THORACIC SPINE, INITIAL ENCOUNTER: Primary | ICD-10-CM

## 2018-03-15 PROCEDURE — 99213 OFFICE O/P EST LOW 20 MIN: CPT | Performed by: FAMILY MEDICINE

## 2018-03-15 RX ORDER — KETOROLAC TROMETHAMINE 30 MG/ML
60 INJECTION, SOLUTION INTRAMUSCULAR; INTRAVENOUS ONCE
Status: COMPLETED | OUTPATIENT
Start: 2018-03-15 | End: 2018-03-15

## 2018-03-15 RX ORDER — CYCLOBENZAPRINE HCL 5 MG
5 TABLET ORAL
Qty: 10 TABLET | Refills: 0 | Status: SHIPPED | OUTPATIENT
Start: 2018-03-15 | End: 2018-03-27

## 2018-03-15 RX ADMIN — KETOROLAC TROMETHAMINE 60 MG: 30 INJECTION, SOLUTION INTRAMUSCULAR; INTRAVENOUS at 22:02

## 2018-03-15 NOTE — LETTER
March 15, 2018     Patient: Ginny Haji   YOB: 1975   Date of Visit: 3/15/2018       To Whom It May Concern: It is my medical opinion that Ginny Haji may return to work on 3/17/2018  If you have any questions or concerns, please don't hesitate to call           Sincerely,        Kingston Claude, MD    CC: No Recipients

## 2018-03-16 NOTE — PATIENT INSTRUCTIONS
Left thoracic spine sprain  Patient received 60 milligrams of Toradol IM in the office  I prescribed cyclobenzaprine 5 milligram q h s     She was 1 about possible side effects such as somnolence  Advised patient to use over-the-counter ibuprofen as needed and to apply he compress to affected area  Advised to follow up primary care provider pain or stiffness worsens  She expressed understanding  Upper Back Exercises   AMBULATORY CARE:   Upper back exercises  help heal and strengthen your back muscles and prevent another injury  Ask your healthcare provider if you need to see a physical therapist for more advanced exercises  · Do the exercises on a mat or firm surface  (not on a bed) to support your spine  · Move slowly and smoothly  Avoid fast or jerky motions  · Breathe normally  Do not hold your breath  · Stop if you feel pain  It is normal to feel some discomfort at first  Regular exercise will help decrease your discomfort over time  Seek care immediately if:   · You have severe pain that prevents you from moving  Contact your healthcare provider if:   · Your pain becomes worse  · You have new pain  · You have questions or concerns about your condition, care, or exercise program   Perform upper back exercises safely:  Ask your healthcare provider which of the following exercises are best for you and how often to do them  · Head rolls:  Sit in a chair or stand  Bring your chin toward your chest and roll your head to the right  Your ear should be over your shoulder  Hold this position for 5 seconds  Roll your head back toward your chest and to the left  Your ear should be over your left shoulder  Hold this position for 5 seconds  Next, roll your head back slowly in a clockwise [place] and repeat 3 times  Do 3 sets of head rolls  · Scapular squeeze:  Sit or stand with your arms at your sides  Squeeze your shoulder blades together and hold for 3 seconds   Relax and repeat 3 times            · Pectoralis stretch:   a doorway  Lift your hands and place them on each side of the door frame or wall slightly higher than your head  Lean forward slowly until you feel a gentle stretch  Hold for 15 seconds  Repeat 3 times, or as directed  · Cat and camel exercise:  Place your hands and knees on the floor  Arch your back upward toward the ceiling and lower your head  Round out your spine as much as you can  Hold for 5 seconds  Lift your head upward and push your chest downward toward the floor  Hold for 5 seconds  Do 3 sets or as directed  · Bird dog:  Place your hands and knees on the floor  Keep your wrists directly below your shoulders and your knees directly below your hips  Pull your belly button in toward your spine  Do not flatten or arch your back  Tighten your abdominal muscles  Raise one arm straight out so that it is aligned with your head  Next, raise the leg opposite your arm  Hold this position for 15 seconds  Lower your arm and leg slowly and change sides  Do 5 sets  © 2017 [employer]  Information is for End User's use only and may not be sold, redistributed or otherwise used for commercial purposes  All illustrations and images included in CareNotes® are the copyrighted property of A D [employer] M , Inc  or [employer] Rosenberg  The above information is an  only  It is not intended as medical advice for individual conditions or treatments  Talk to your doctor, nurse or pharmacist before following any medical regimen to see if it is safe and effective for you

## 2018-03-16 NOTE — PROGRESS NOTES
[employer]        NAME: Catrachita Oneal is a 38 y o  female  : 1975    MRN: [id number]  DATE: March 15, 2018  TIME: 10:16 PM    Assessment and Plan   Sprain of ligaments of thoracic spine, initial encounter [S23  3XXA]  1  Sprain of ligaments of thoracic spine, initial encounter  ketorolac (TORADOL) injection 60 mg    cyclobenzaprine (FLEXERIL) 5 mg tablet         Patient Instructions       Follow up with PCP in 3-5 days  Proceed to  ER if symptoms worsen  Chief Complaint     Chief Complaint   Patient presents with    Back Pain         History of Present Illness       Patient with complaints of left lower back pain since 4:45 p m  Papi Wayne Denies any fall or trauma or heavy lifting  Describes that the pain starts in left lower back and radiates into the left anterior lower rib area  Describes the pain fluctuates between dull and sharp on and off  Symptoms aggravated by turning or bending or taking deep inspiration  Pain diminish his upon standing  She is taking no pain medication for current symptoms no previous history in the past   Her pain at the present time is 8/10  She currently works as a   Back Pain         Review of Systems   Review of Systems   Constitutional: Negative  Respiratory: Negative  Cardiovascular: Negative  Musculoskeletal: Positive for back pain  Neurological: Negative            Current Medications       Current Outpatient Prescriptions:     cyclobenzaprine (FLEXERIL) 5 mg tablet, Take 1 tablet (5 mg total) by mouth daily at bedtime for 10 days, Disp: 10 tablet, Rfl: 0    diclofenac (VOLTAREN) 75 mg EC tablet, Take 1 tablet by mouth Twice daily, Disp: , Rfl:     fluticasone (FLONASE) 50 mcg/act nasal spray, 1 spray into each nostril 2 (two) times a day, Disp: 16 g, Rfl: 0    omeprazole (PriLOSEC) 20 mg delayed release capsule, Take 1 capsule by mouth daily, Disp: , Rfl:     valACYclovir (VALTREX) 1,000 mg tablet, Take 2 tablets by mouth 2 (two) times a day, Disp: , Rfl:   No current facility-administered medications for this visit  Current Allergies     Allergies as of 03/15/2018    (No Known Allergies)            The following portions of the patient's history were reviewed and updated as appropriate: allergies, current medications, past family history, past medical history, past social history, past surgical history and problem list      No past medical history on file  No past surgical history on file  No family history on file  Medications have been verified  Objective   /78   Pulse 68   Temp 97 7 °F (36 5 °C)   Resp 18   Ht 5' 6" (1 676 m)   Wt 79 4 kg (175 lb)   SpO2 100%   BMI 28 25 kg/m²        Physical Exam     Physical Exam   Constitutional: She appears well-developed and well-nourished  Cardiovascular: Normal rate and regular rhythm  Pulmonary/Chest: Effort normal and breath sounds normal    Musculoskeletal: She exhibits tenderness  Thoracic spine-decreased range of motion on flexion and extension  Left lateral rotation and left side bending triggers pain in the lower left thoracic area  There is point tenderness of the left paraspinal muscle  Nursing note and vitals reviewed

## 2018-03-27 ENCOUNTER — OFFICE VISIT (OUTPATIENT)
Dept: FAMILY MEDICINE CLINIC | Facility: CLINIC | Age: 43
End: 2018-03-27
Payer: COMMERCIAL

## 2018-03-27 VITALS
SYSTOLIC BLOOD PRESSURE: 132 MMHG | WEIGHT: 184 LBS | HEIGHT: 55 IN | BODY MASS INDEX: 42.58 KG/M2 | DIASTOLIC BLOOD PRESSURE: 68 MMHG

## 2018-03-27 DIAGNOSIS — J45.40 MODERATE PERSISTENT ASTHMA WITHOUT COMPLICATION: ICD-10-CM

## 2018-03-27 DIAGNOSIS — J06.9 URTI (ACUTE UPPER RESPIRATORY INFECTION): Primary | ICD-10-CM

## 2018-03-27 PROCEDURE — 99213 OFFICE O/P EST LOW 20 MIN: CPT | Performed by: FAMILY MEDICINE

## 2018-03-27 RX ORDER — AZITHROMYCIN 250 MG/1
TABLET, FILM COATED ORAL
Qty: 6 TABLET | Refills: 0 | Status: SHIPPED | OUTPATIENT
Start: 2018-03-27 | End: 2018-04-01

## 2018-03-27 NOTE — PROGRESS NOTES
Assessment/Plan:    I recommend supportive care fluids rest follow-up if not a lot better in 5-7 days  Diagnoses and all orders for this visit:    URTI (acute upper respiratory infection)  -     azithromycin (ZITHROMAX) 250 mg tablet; Take 2 tablets today then 1 tablet daily x 4 days    Moderate persistent asthma without complication          Subjective:      Patient ID: Gabriel Valiente is a 38 y o  female  HPI    The following portions of the patient's history were reviewed and updated as appropriate: allergies, current medications, past family history, past medical history, past social history, past surgical history and problem list     Review of Systems   Constitutional: Positive for activity change, chills and fatigue  HENT: Positive for congestion, ear pain, sinus pain, sinus pressure and sore throat  Eyes: Negative  Respiratory: Positive for cough  Negative for shortness of breath  Cardiovascular: Negative  Gastrointestinal: Negative  Endocrine: Negative  Genitourinary: Negative  Musculoskeletal: Positive for myalgias  Objective:      /68 (BP Location: Right arm, Patient Position: Sitting, Cuff Size: Standard)   Ht 1' 8 11" (0 511 m)   Wt 83 5 kg (184 lb)   LMP 02/27/2018   Breastfeeding? No    82 kg/m²          Physical Exam   Constitutional: She appears well-developed  HENT:   Ears show fluid bilaterally your theme a on the left  Throat is red with postnasal drip mild adenopathy  Neck is supple  Eyes: EOM are normal  Pupils are equal, round, and reactive to light  Neck: Normal range of motion  Neck supple  Cardiovascular: Normal rate and regular rhythm  Pulmonary/Chest: Effort normal and breath sounds normal    Abdominal: Soft  Musculoskeletal: Normal range of motion  Nursing note and vitals reviewed

## 2018-03-27 NOTE — LETTER
March 27, 2018     Patient: Ginny Haji   YOB: 1975   Date of Visit: 3/27/2018       To Whom it May Concern:    Ginny Haji is under my professional care  She was seen in my office on 3/27/2018  She may return to work on Wednesday March 28, 2018  If you have any questions or concerns, please don't hesitate to call           Sincerely,          Benjamin Oden DO        CC: No Recipients

## 2018-04-03 ENCOUNTER — OFFICE VISIT (OUTPATIENT)
Dept: FAMILY MEDICINE CLINIC | Facility: CLINIC | Age: 43
End: 2018-04-03
Payer: COMMERCIAL

## 2018-04-03 VITALS
HEART RATE: 78 BPM | RESPIRATION RATE: 18 BRPM | DIASTOLIC BLOOD PRESSURE: 68 MMHG | BODY MASS INDEX: 28.93 KG/M2 | WEIGHT: 180 LBS | HEIGHT: 66 IN | TEMPERATURE: 97.5 F | SYSTOLIC BLOOD PRESSURE: 128 MMHG

## 2018-04-03 DIAGNOSIS — J06.9 URTI (ACUTE UPPER RESPIRATORY INFECTION): Primary | ICD-10-CM

## 2018-04-03 PROCEDURE — 99213 OFFICE O/P EST LOW 20 MIN: CPT | Performed by: FAMILY MEDICINE

## 2018-04-03 RX ORDER — METHYLPREDNISOLONE 4 MG/1
TABLET ORAL
Qty: 21 TABLET | Refills: 0 | Status: SHIPPED | OUTPATIENT
Start: 2018-04-03 | End: 2018-11-08 | Stop reason: ALTCHOICE

## 2018-04-03 NOTE — PROGRESS NOTES
Assessment/Plan:  I recommend rest plenty of fluids follow-up if not better in 5 days  Diagnoses and all orders for this visit:    URTI (acute upper respiratory infection)  -     Methylprednisolone 4 MG TBPK; Use as directed on package          Subjective:      Patient ID: Neyda Hernandez is a 38 y o  female  Patient presents with:  Cold Like Symptoms: not improving from O/V 1 week ago  Sinus Problem  Headache: very tender scalp, feeling dizzy            The following portions of the patient's history were reviewed and updated as appropriate: allergies, current medications, past family history, past medical history, past social history, past surgical history and problem list     Review of Systems   Constitutional: Positive for activity change, appetite change, chills and fever  HENT: Positive for congestion and sinus pain  Eyes: Negative  Respiratory: Negative  Cardiovascular: Negative  Gastrointestinal: Negative  Endocrine: Negative  Genitourinary: Negative  Musculoskeletal: Positive for arthralgias and joint swelling  Skin: Negative  Objective:      /68 (BP Location: Right arm)   Pulse 78   Temp 97 5 °F (36 4 °C)   Resp 18   Ht 5' 6" (1 676 m)   Wt 81 6 kg (180 lb)   BMI 29 05 kg/m²          Physical Exam   Constitutional: She appears well-developed  HENT:   Right Ear: External ear normal    Left Ear: External ear normal    Nose: Nose normal    Mouth/Throat: Oropharynx is clear and moist    She is very tender in the right side of her neck leading up into her scalp also down across top of her chest on the right  Eyes: EOM are normal  Pupils are equal, round, and reactive to light  Neck: Normal range of motion  Cardiovascular: Normal rate and regular rhythm  Pulmonary/Chest: Effort normal and breath sounds normal    Abdominal: Soft  Musculoskeletal: Normal range of motion  Nursing note and vitals reviewed

## 2018-05-13 ENCOUNTER — OFFICE VISIT (OUTPATIENT)
Dept: URGENT CARE | Facility: MEDICAL CENTER | Age: 43
End: 2018-05-13
Payer: COMMERCIAL

## 2018-05-13 VITALS
RESPIRATION RATE: 20 BRPM | TEMPERATURE: 97.8 F | OXYGEN SATURATION: 100 % | HEART RATE: 66 BPM | DIASTOLIC BLOOD PRESSURE: 70 MMHG | SYSTOLIC BLOOD PRESSURE: 118 MMHG

## 2018-05-13 DIAGNOSIS — T50.905A ADVERSE EFFECT DUE TO CORRECT MEDICINAL SUBSTANCE, PROPERLY GIVEN, INITIAL ENCOUNTER: Primary | ICD-10-CM

## 2018-05-13 PROCEDURE — 99213 OFFICE O/P EST LOW 20 MIN: CPT | Performed by: FAMILY MEDICINE

## 2018-05-13 NOTE — PATIENT INSTRUCTIONS
Adverse effect most likely secondary to Medrol Dosepak she was prescribed approximately 3 weeks ago  Patient advised to consider taking over-the-counter Benadryl or Claritin as antihistamine to control itching  She is to avoid sun exposure  Strongly advised patient not to wear makeup for the next day or 2  If rash persists or worsen 2-3 days, follow up with primary care provider  She expressed understanding  Acne   WHAT YOU NEED TO KNOW:   Acne is a condition that causes red bumps, or pimples, to form on your skin  It is a long-term skin problem that is common in young adults  DISCHARGE INSTRUCTIONS:   Medicines: You may need any of the following:  · Topical treatments  are medicines that you put on your skin to kill germs, or to treat blackheads or whiteheads  Topicals may also reduce swelling or stop skin from peeling  They are available as gels, creams, pastes, liquids, and cleansers  · Antibiotics  may be given to treat an infection caused by bacteria  · Mild acids , such as salicylic or azelaic acid, help kill bacteria and improve your acne  · Hormone medicine  may help balance your hormone levels and reduce the amount of oil your pores make  · Retinoids  are prescription medicines to treat severe acne lesions  It is often used when other treatments do not work  You will need close follow-up if you take this medicine  Do not use this medicine if you are pregnant or breastfeeding  Retinoids may cause serious birth defects  Ask your healthcare provider for more information before you use this medicine  · Take your medicine as directed  Contact your healthcare provider if you think your medicine is not helping or if you have side effects  Tell him if you are allergic to any medicine  Keep a list of the medicines, vitamins, and herbs you take  Include the amounts, and when and why you take them  Bring the list or the pill bottles to follow-up visits   Carry your medicine list with you in case of an emergency  Use mild soap daily when you bathe: This will help control oiliness  Do not use harsh or drying soaps  Use oil-free lotion and sunscreen: This will help decrease irritation and keep your pores clear  Follow up with your healthcare provider as directed: You may need to return for regular blood tests  Write down your questions so you remember to ask them during your visits  Prevent acne:  Use only the acne products that your healthcare provider recommends  Gels, solutions, cleansers, and medicated gauze pads may be used to reduce oily skin  Creams, ointments, and lotions are better if you have dry, sensitive skin  Continue to use these products as directed to prevent new acne  You may need to use your skin products less often if your skin gets irritated  You may need to stop using them until the irritation goes away  Contact your healthcare provider if:   · You have a fever and inflammation of your skin  · You are using retinoid medicine and you think you might be pregnant  · Your acne does not get better, even after treatment  · You have acne scars  · You begin to have mood swings or personality changes  · You have questions or concerns about your condition or care  © 2017 [address] is for End User's use only and may not be sold, redistributed or otherwise used for commercial purposes  All illustrations and images included in CareNotes® are the copyrighted property of [employer] , [employer]  or [employer]  The above information is an  only  It is not intended as medical advice for individual conditions or treatments  Talk to your doctor, nurse or pharmacist before following any medical regimen to see if it is safe and effective for you

## 2018-06-07 ENCOUNTER — OFFICE VISIT (OUTPATIENT)
Dept: FAMILY MEDICINE CLINIC | Facility: CLINIC | Age: 43
End: 2018-06-07
Payer: COMMERCIAL

## 2018-06-07 VITALS
DIASTOLIC BLOOD PRESSURE: 78 MMHG | BODY MASS INDEX: 29.73 KG/M2 | WEIGHT: 185 LBS | HEIGHT: 66 IN | SYSTOLIC BLOOD PRESSURE: 128 MMHG | TEMPERATURE: 98 F

## 2018-06-07 DIAGNOSIS — R42 LIGHTHEADEDNESS: Primary | ICD-10-CM

## 2018-06-07 DIAGNOSIS — Z13.220 LIPID SCREENING: ICD-10-CM

## 2018-06-07 DIAGNOSIS — D50.9 IRON DEFICIENCY ANEMIA, UNSPECIFIED IRON DEFICIENCY ANEMIA TYPE: ICD-10-CM

## 2018-06-07 DIAGNOSIS — J30.1 ALLERGIC RHINITIS DUE TO POLLEN, UNSPECIFIED SEASONALITY: ICD-10-CM

## 2018-06-07 PROBLEM — J30.9 ALLERGIC RHINITIS: Status: ACTIVE | Noted: 2018-06-07

## 2018-06-07 PROCEDURE — 99214 OFFICE O/P EST MOD 30 MIN: CPT | Performed by: FAMILY MEDICINE

## 2018-06-07 NOTE — PROGRESS NOTES
Assessment/Plan:    47-year-old woman with:  Lightheadedness, iron deficiency anemia, lipid screening and allergic rhinitis  Discussed workup and treatment options with risks and benefits  Will check fasting blood work  Patient to increase p o  liquids with electrolytes and will restart Flonase that she has at home  Encourage patient follow her symptoms closely and returning in 2 weeks to discuss improvement and testing  No problem-specific Assessment & Plan notes found for this encounter  Diagnoses and all orders for this visit:    Lightheadedness  -     CBC and differential; Future  -     Comprehensive metabolic panel; Future  -     TSH, 3rd generation with T4 reflex; Future  -     Lipid Panel with Direct LDL reflex; Future  -     Sedimentation rate, automated; Future  -     C-reactive protein; Future  -     Urinalysis with reflex to microscopic  -     ERIN Screen w/ Reflex to Titer/Pattern; Future    Iron deficiency anemia, unspecified iron deficiency anemia type  -     CBC and differential; Future  -     Comprehensive metabolic panel; Future  -     TSH, 3rd generation with T4 reflex; Future  -     Lipid Panel with Direct LDL reflex; Future  -     Sedimentation rate, automated; Future  -     C-reactive protein; Future  -     Urinalysis with reflex to microscopic  -     ERIN Screen w/ Reflex to Titer/Pattern; Future    Lipid screening  -     CBC and differential; Future  -     Comprehensive metabolic panel; Future  -     TSH, 3rd generation with T4 reflex; Future  -     Lipid Panel with Direct LDL reflex; Future  -     Sedimentation rate, automated; Future  -     C-reactive protein; Future  -     Urinalysis with reflex to microscopic  -     ERIN Screen w/ Reflex to Titer/Pattern; Future          Subjective:   Chief Complaint   Patient presents with    Dizziness     Past two weeks          Patient ID: Joey Tarango is a 38 y o  female      Patient is a 47-year-old woman who presents complaining of 2 weeks of dizziness and lightheadedness that is particularly pervasive although intermittent  No fevers chills nausea vomiting  No head trauma  No change to her sleep, diet, stress levels  No changes to her medications  Patient does have allergies and she has been treating with Benadryl patient also has a history of iron deficiency anemia and is due for blood work along with its screening  The following portions of the patient's history were reviewed and updated as appropriate: allergies, current medications, past family history, past medical history, past social history, past surgical history and problem list     Review of Systems   Constitutional: Negative  HENT: Negative  Eyes: Negative  Respiratory: Negative  Cardiovascular: Negative  Gastrointestinal: Negative  Endocrine: Negative  Genitourinary: Negative  Musculoskeletal: Negative  Allergic/Immunologic: Negative  Neurological: Positive for dizziness and light-headedness  Hematological: Negative  Psychiatric/Behavioral: Negative  All other systems reviewed and are negative  Objective:      /78 (BP Location: Right arm, Patient Position: Sitting, Cuff Size: Standard)   Temp 98 °F (36 7 °C) (Tympanic)   Ht 5' 6" (1 676 m)   Wt 83 9 kg (185 lb)   BMI 29 86 kg/m²          Physical Exam   Constitutional: She is oriented to person, place, and time  She appears well-developed and well-nourished  HENT:   Head: Atraumatic  Right Ear: External ear normal    Left Ear: External ear normal    Eyes: Conjunctivae and EOM are normal  Pupils are equal, round, and reactive to light  Neck: Normal range of motion  Cardiovascular: Normal rate, regular rhythm and normal heart sounds  Pulmonary/Chest: Effort normal and breath sounds normal  No respiratory distress  Abdominal: Soft  She exhibits no distension  There is no tenderness  There is no rebound and no guarding  Musculoskeletal: Normal range of motion  Neurological: She is alert and oriented to person, place, and time  No cranial nerve deficit  Skin: Skin is warm and dry  Psychiatric: She has a normal mood and affect   Her behavior is normal  Judgment and thought content normal

## 2018-06-08 ENCOUNTER — APPOINTMENT (OUTPATIENT)
Dept: LAB | Facility: HOSPITAL | Age: 43
End: 2018-06-08
Payer: COMMERCIAL

## 2018-06-08 DIAGNOSIS — D50.9 IRON DEFICIENCY ANEMIA, UNSPECIFIED IRON DEFICIENCY ANEMIA TYPE: ICD-10-CM

## 2018-06-08 DIAGNOSIS — Z13.220 LIPID SCREENING: ICD-10-CM

## 2018-06-08 DIAGNOSIS — R42 LIGHTHEADEDNESS: ICD-10-CM

## 2018-06-08 LAB
ALBUMIN SERPL BCP-MCNC: 3.6 G/DL (ref 3.5–5)
ALP SERPL-CCNC: 50 U/L (ref 46–116)
ALT SERPL W P-5'-P-CCNC: 21 U/L (ref 12–78)
ANION GAP SERPL CALCULATED.3IONS-SCNC: 7 MMOL/L (ref 4–13)
AST SERPL W P-5'-P-CCNC: 16 U/L (ref 5–45)
BACTERIA UR QL AUTO: ABNORMAL /HPF
BASOPHILS # BLD AUTO: 0.01 THOUSANDS/ΜL (ref 0–0.1)
BASOPHILS NFR BLD AUTO: 0 % (ref 0–1)
BILIRUB SERPL-MCNC: 0.53 MG/DL (ref 0.2–1)
BILIRUB UR QL STRIP: NEGATIVE
BUN SERPL-MCNC: 19 MG/DL (ref 5–25)
CALCIUM SERPL-MCNC: 8.3 MG/DL (ref 8.3–10.1)
CHLORIDE SERPL-SCNC: 104 MMOL/L (ref 100–108)
CHOLEST SERPL-MCNC: 179 MG/DL (ref 50–200)
CLARITY UR: ABNORMAL
CO2 SERPL-SCNC: 28 MMOL/L (ref 21–32)
COLOR UR: YELLOW
CREAT SERPL-MCNC: 0.97 MG/DL (ref 0.6–1.3)
CRP SERPL QL: <3 MG/L
EOSINOPHIL # BLD AUTO: 0.11 THOUSAND/ΜL (ref 0–0.61)
EOSINOPHIL NFR BLD AUTO: 2 % (ref 0–6)
ERYTHROCYTE [DISTWIDTH] IN BLOOD BY AUTOMATED COUNT: 14.9 % (ref 11.6–15.1)
ERYTHROCYTE [SEDIMENTATION RATE] IN BLOOD: 5 MM/HOUR (ref 0–20)
GFR SERPL CREATININE-BSD FRML MDRD: 73 ML/MIN/1.73SQ M
GLUCOSE P FAST SERPL-MCNC: 85 MG/DL (ref 65–99)
GLUCOSE UR STRIP-MCNC: NEGATIVE MG/DL
HCT VFR BLD AUTO: 37.3 % (ref 34.8–46.1)
HDLC SERPL-MCNC: 61 MG/DL (ref 40–60)
HGB BLD-MCNC: 12 G/DL (ref 11.5–15.4)
HGB UR QL STRIP.AUTO: NEGATIVE
KETONES UR STRIP-MCNC: NEGATIVE MG/DL
LDLC SERPL CALC-MCNC: 107 MG/DL (ref 0–100)
LEUKOCYTE ESTERASE UR QL STRIP: ABNORMAL
LYMPHOCYTES # BLD AUTO: 2.02 THOUSANDS/ΜL (ref 0.6–4.47)
LYMPHOCYTES NFR BLD AUTO: 35 % (ref 14–44)
MCH RBC QN AUTO: 25.8 PG (ref 26.8–34.3)
MCHC RBC AUTO-ENTMCNC: 32.2 G/DL (ref 31.4–37.4)
MCV RBC AUTO: 80 FL (ref 82–98)
MONOCYTES # BLD AUTO: 0.51 THOUSAND/ΜL (ref 0.17–1.22)
MONOCYTES NFR BLD AUTO: 9 % (ref 4–12)
NEUTROPHILS # BLD AUTO: 3.2 THOUSANDS/ΜL (ref 1.85–7.62)
NEUTS SEG NFR BLD AUTO: 55 % (ref 43–75)
NITRITE UR QL STRIP: NEGATIVE
NON-SQ EPI CELLS URNS QL MICRO: ABNORMAL /HPF
NRBC BLD AUTO-RTO: 0 /100 WBCS
PH UR STRIP.AUTO: 6 [PH] (ref 4.5–8)
PLATELET # BLD AUTO: 150 THOUSANDS/UL (ref 149–390)
POTASSIUM SERPL-SCNC: 3.8 MMOL/L (ref 3.5–5.3)
PROT SERPL-MCNC: 7.5 G/DL (ref 6.4–8.2)
PROT UR STRIP-MCNC: NEGATIVE MG/DL
RBC # BLD AUTO: 4.66 MILLION/UL (ref 3.81–5.12)
RBC #/AREA URNS AUTO: ABNORMAL /HPF
SODIUM SERPL-SCNC: 139 MMOL/L (ref 136–145)
SP GR UR STRIP.AUTO: 1.02 (ref 1–1.03)
TRIGL SERPL-MCNC: 54 MG/DL
TSH SERPL DL<=0.05 MIU/L-ACNC: 2.16 UIU/ML (ref 0.36–3.74)
UROBILINOGEN UR QL STRIP.AUTO: 0.2 E.U./DL
WBC # BLD AUTO: 5.85 THOUSAND/UL (ref 4.31–10.16)
WBC #/AREA URNS AUTO: ABNORMAL /HPF

## 2018-06-08 PROCEDURE — 86038 ANTINUCLEAR ANTIBODIES: CPT

## 2018-06-08 PROCEDURE — 85025 COMPLETE CBC W/AUTO DIFF WBC: CPT

## 2018-06-08 PROCEDURE — 80061 LIPID PANEL: CPT

## 2018-06-08 PROCEDURE — 80053 COMPREHEN METABOLIC PANEL: CPT

## 2018-06-08 PROCEDURE — 36415 COLL VENOUS BLD VENIPUNCTURE: CPT

## 2018-06-08 PROCEDURE — 85652 RBC SED RATE AUTOMATED: CPT

## 2018-06-08 PROCEDURE — 84443 ASSAY THYROID STIM HORMONE: CPT

## 2018-06-08 PROCEDURE — 86140 C-REACTIVE PROTEIN: CPT

## 2018-06-08 PROCEDURE — 81001 URINALYSIS AUTO W/SCOPE: CPT | Performed by: FAMILY MEDICINE

## 2018-06-11 LAB — RYE IGE QN: NEGATIVE

## 2018-06-21 ENCOUNTER — OFFICE VISIT (OUTPATIENT)
Dept: FAMILY MEDICINE CLINIC | Facility: CLINIC | Age: 43
End: 2018-06-21
Payer: COMMERCIAL

## 2018-06-21 VITALS
SYSTOLIC BLOOD PRESSURE: 112 MMHG | TEMPERATURE: 98.2 F | DIASTOLIC BLOOD PRESSURE: 84 MMHG | BODY MASS INDEX: 29.73 KG/M2 | HEIGHT: 66 IN | WEIGHT: 185 LBS

## 2018-06-21 DIAGNOSIS — S23.9XXD THORACIC BACK SPRAIN, SUBSEQUENT ENCOUNTER: Primary | ICD-10-CM

## 2018-06-21 DIAGNOSIS — E78.49 OTHER HYPERLIPIDEMIA: ICD-10-CM

## 2018-06-21 DIAGNOSIS — J30.1 ALLERGIC RHINITIS DUE TO POLLEN, UNSPECIFIED SEASONALITY: ICD-10-CM

## 2018-06-21 DIAGNOSIS — J01.00 ACUTE NON-RECURRENT MAXILLARY SINUSITIS: ICD-10-CM

## 2018-06-21 PROBLEM — S23.9XXA THORACIC BACK SPRAIN: Status: ACTIVE | Noted: 2018-06-21

## 2018-06-21 PROCEDURE — 99214 OFFICE O/P EST MOD 30 MIN: CPT | Performed by: FAMILY MEDICINE

## 2018-06-21 PROCEDURE — 3008F BODY MASS INDEX DOCD: CPT | Performed by: FAMILY MEDICINE

## 2018-06-21 RX ORDER — CETIRIZINE HYDROCHLORIDE 10 MG/1
10 TABLET ORAL DAILY
Qty: 90 TABLET | Refills: 0 | Status: SHIPPED | OUTPATIENT
Start: 2018-06-21 | End: 2018-12-07

## 2018-06-21 RX ORDER — METHOCARBAMOL 500 MG/1
500 TABLET, FILM COATED ORAL 3 TIMES DAILY
Qty: 90 TABLET | Refills: 0 | Status: SHIPPED | OUTPATIENT
Start: 2018-06-21 | End: 2019-01-16 | Stop reason: SDUPTHER

## 2018-06-21 RX ORDER — FLUTICASONE PROPIONATE 50 MCG
1 SPRAY, SUSPENSION (ML) NASAL 2 TIMES DAILY
Qty: 16 G | Refills: 0 | Status: SHIPPED | OUTPATIENT
Start: 2018-06-21 | End: 2019-04-10 | Stop reason: SDUPTHER

## 2018-06-21 NOTE — PROGRESS NOTES
Assessment/Plan:    21-year-old woman with:  Sinusitis, allergic rhinitis, hyperlipidemia and thoracic back sprain  Discussed supportive care return parameters  Continue allergy medications  Discussed healthy diet like the Mediterranean diet, exercise, healthy weight as tolerated  Will refer to physical therapy and encourage continued heat and cold, stretching and anti-inflammatories  Will give muscle relaxer to use for breakthrough symptoms  Patient call back if symptoms are not improving or if they are worsening  Follow-up p r n  No problem-specific Assessment & Plan notes found for this encounter  Diagnoses and all orders for this visit:    Thoracic back sprain, subsequent encounter  -     Ambulatory referral to Physical Therapy; Future  -     methocarbamol (ROBAXIN) 500 mg tablet; Take 1 tablet (500 mg total) by mouth 3 (three) times a day    Acute non-recurrent maxillary sinusitis  -     fluticasone (FLONASE) 50 mcg/act nasal spray; 1 spray into each nostril 2 (two) times a day    Allergic rhinitis due to pollen, unspecified seasonality  -     cetirizine (ZyrTEC) 10 mg tablet; Take 1 tablet (10 mg total) by mouth daily    Other hyperlipidemia          Subjective:   Chief Complaint   Patient presents with    Follow-up     BW Results    Back Pain     last couple of months-right side lower          Patient ID: Derek Luo is a 38 y o  female  Patient is a 21-year-old woman who presents for follow-up on allergies and sinus infection  Patient admits her symptoms are improving significantly and she requests refill of her allergy medicine  Patient had recent blood work which showed elevated cholesterol but was otherwise normal   Patient also admits some midthoracic back pain gradually worsening over the past several months  No weakness numbness tingling  No bowel or bladder control issues  No other complaints at this time        Back Pain         The following portions of the patient's history were reviewed and updated as appropriate: allergies, current medications, past family history, past medical history, past social history, past surgical history and problem list       Review of Systems   Constitutional: Negative  HENT: Negative  Eyes: Negative  Respiratory: Negative  Cardiovascular: Negative  Gastrointestinal: Negative  Endocrine: Negative  Genitourinary: Negative  Musculoskeletal: Positive for back pain  Allergic/Immunologic: Negative  Neurological: Negative  Hematological: Negative  Psychiatric/Behavioral: Negative  All other systems reviewed and are negative  Objective:      /84 (BP Location: Left arm, Patient Position: Sitting, Cuff Size: Standard)   Temp 98 2 °F (36 8 °C) (Temporal)   Ht 5' 6" (1 676 m)   Wt 83 9 kg (185 lb)   BMI 29 86 kg/m²          Physical Exam   Constitutional: She is oriented to person, place, and time  She appears well-developed and well-nourished  HENT:   Head: Atraumatic  Right Ear: External ear normal    Left Ear: External ear normal    Eyes: Conjunctivae and EOM are normal  Pupils are equal, round, and reactive to light  Neck: Normal range of motion  Pulmonary/Chest: Effort normal  No respiratory distress  Musculoskeletal: Normal range of motion  Right-sided mid thoracic paraspinal tenderness and palpable muscle spasm    Neurological: She is alert and oriented to person, place, and time  No cranial nerve deficit  Skin: Skin is warm and dry  Psychiatric: She has a normal mood and affect   Her behavior is normal  Judgment and thought content normal

## 2018-10-29 ENCOUNTER — OFFICE VISIT (OUTPATIENT)
Dept: OBGYN CLINIC | Facility: MEDICAL CENTER | Age: 43
End: 2018-10-29
Payer: OTHER MISCELLANEOUS

## 2018-10-29 ENCOUNTER — APPOINTMENT (OUTPATIENT)
Dept: RADIOLOGY | Facility: CLINIC | Age: 43
End: 2018-10-29
Payer: OTHER MISCELLANEOUS

## 2018-10-29 VITALS
DIASTOLIC BLOOD PRESSURE: 84 MMHG | HEART RATE: 63 BPM | BODY MASS INDEX: 30.53 KG/M2 | SYSTOLIC BLOOD PRESSURE: 131 MMHG | WEIGHT: 190 LBS | HEIGHT: 66 IN

## 2018-10-29 DIAGNOSIS — M25.511 RIGHT SHOULDER PAIN, UNSPECIFIED CHRONICITY: Primary | ICD-10-CM

## 2018-10-29 DIAGNOSIS — M75.41 IMPINGEMENT SYNDROME OF RIGHT SHOULDER: ICD-10-CM

## 2018-10-29 DIAGNOSIS — M25.511 RIGHT SHOULDER PAIN, UNSPECIFIED CHRONICITY: ICD-10-CM

## 2018-10-29 PROCEDURE — 99203 OFFICE O/P NEW LOW 30 MIN: CPT | Performed by: ORTHOPAEDIC SURGERY

## 2018-10-29 PROCEDURE — 73030 X-RAY EXAM OF SHOULDER: CPT

## 2018-10-29 NOTE — PROGRESS NOTES
Ortho Sports Medicine Shoulder Visit     Assesment:     right shoulder impingement syndrome and bursitis    Plan:    Conservative treatment:    Ice to shoulder 1-2 times daily, for 20 minutes at a time  PT for ROM and strengthening to shoulder, rotator cuff, scapular stabilizers  Let pain guide return to activities  She will follow up in 8 weeks for re evaluation of her RIGHT shoulder  If symptoms persist, will order MRI of shoulder to evaluate labrum and rotator cuff  Imaging: All imaging from today was reviewed by myself and explained to the patient  Injection:    No Injection planned at this time  Surgery:     No surgery is recommended at this point, continue with conservative treatment plan as noted  History of Present Illness: The patient is a 41 y o , right hand dominant female whose occupation is a  at [employer], referred to me by themself, seen in clinic for evaluation of right shoulder pain  The patient denies a history of diabetes  The patient denies a history of thyroid disorder  Pain is located anterior, lateral   The patient rates the pain as a 5/10  The pain has been present for 6 days  The patient sustained an injury on 10/23/2018  Patient stated that the mechanism of injury was typing for a prolonged period of time at work  The pain is characterized as sharp, stabbing  The pain is present at all times  Pain is improved by rest   Pain is aggravated by overhead activity, reaching back, rotation and lifting   Symptoms include clicking and cracking  The patient denies weakness  The patient denies numbness and tingling  The patient has tried rest and NSAIDS            Shoulder Surgical History:  None    Past Medical, Social and Family History:  Past Medical History:   Diagnosis Date    Asthma      Past Surgical History:   Procedure Laterality Date    KNEE SURGERY      TUBAL LIGATION       Allergies Allergen Reactions    Prednisone Facial Swelling     Current Outpatient Prescriptions on File Prior to Visit   Medication Sig Dispense Refill    cetirizine (ZyrTEC) 10 mg tablet Take 1 tablet (10 mg total) by mouth daily 90 tablet 0    diclofenac (VOLTAREN) 75 mg EC tablet Take 1 tablet by mouth Twice daily      fluticasone (FLONASE) 50 mcg/act nasal spray 1 spray into each nostril 2 (two) times a day 16 g 0    methocarbamol (ROBAXIN) 500 mg tablet Take 1 tablet (500 mg total) by mouth 3 (three) times a day 90 tablet 0    Methylprednisolone 4 MG TBPK Use as directed on package (Patient not taking: Reported on 10/29/2018 ) 21 tablet 0    omeprazole (PriLOSEC) 20 mg delayed release capsule Take 1 capsule by mouth daily      valACYclovir (VALTREX) 1,000 mg tablet Take 2 tablets by mouth 2 (two) times a day       No current facility-administered medications on file prior to visit  Social History     Social History    Marital status: Single     Spouse name: N/A    Number of children: N/A    Years of education: N/A     Occupational History    Not on file  Social History Main Topics    Smoking status: Never Smoker    Smokeless tobacco: Never Used    Alcohol use Yes      Comment: DRINKING IN MODERATION (2 DRINKS / DAY OR FEWER)    Drug use: No    Sexual activity: Not Currently     Other Topics Concern    Not on file     Social History Narrative     AS PER [employer]    DENIED: HISTORY OF PHYSICAL ABUSE (HISTORY)         I have reviewed the past medical, surgical, social and family history, medications and allergies as documented in the EMR  Review of systems: ROS is negative other than that noted in the HPI  Constitutional: Negative for fatigue and fever  HENT: Negative for sore throat  Respiratory: Negative for shortness of breath  Cardiovascular: Negative for chest pain  Gastrointestinal: Negative for abdominal pain     Endocrine: Negative for cold intolerance and heat intolerance  Genitourinary: Negative for flank pain  Musculoskeletal: Negative for back pain  Skin: Negative for rash  Allergic/Immunologic: Negative for immunocompromised state  Neurological: Negative for dizziness  Psychiatric/Behavioral: Negative for agitation  Physical Exam:    Blood pressure 131/84, pulse 63, height 5' 6" (1 676 m), weight 86 2 kg (190 lb), not currently breastfeeding  General/Constitutional: NAD, well developed, well nourished  HENT: Normocephalic, atraumatic  CV: Intact distal pulses, regular rate  Resp: No respiratory distress or labored breathing  Lymphatic: No lymphadenopathy palpated  Neuro: Alert and Oriented x 3, no focal deficits  Psych: Normal mood, normal affect, normal judgement, normal behavior  Skin: Warm, dry, no rashes, no erythema     Shoulder Exam (focused):     Shoulder focused exam:       RIGHT LEFT    Scapula Atrophy Negative Negative     Winging Negative Negative     Protraction Negative Negative    Rotator cuff SS 5/5 5/5     IS 5/5 5/5     SubS 5/5 5/5    ROM  170     ER0 60 60     ER90 90 90     IR90 40 40     IRb T10 T6    TTP: AC Positive Negative     Biceps Negative Negative     Coracoid Negative Negative    Special Tests: O'Briens Positive Negative     Kurtz-shear Positive Negative     Cross body Adduction Negative Negative     Speeds  Negative Negative     Jens's Negative Negative     Whipple Negative Negative       Neer Negative Negative     Quinones Negative Negative    Instability: Apprehension & relocation not tested not tested     Load & shift not tested not tested    Other: Crank Negative Negative               UE NV Exam: +2 Radial pulses bilaterally  Sensation intact to light touch C5-T1 bilaterally, Radial/median/ulnar nerve motor intact      Bilateral elbow, wrist, and and forearm ROM full, painless with passive ROM, no ttp or crepitance throughout extremities below shoulder joint    Cervical ROM is full without pain, numbness or tingling      Shoulder Imaging    X-rays of the right shoulder were reviewed, which demonstrate Mild Glenohumeral and AC joint Arthritis   I have reviewed the radiology report and do not currently have a radiology reading from [facility], but will check the result once the reading is performed

## 2018-11-08 ENCOUNTER — OFFICE VISIT (OUTPATIENT)
Dept: BARIATRICS | Facility: CLINIC | Age: 43
End: 2018-11-08
Payer: COMMERCIAL

## 2018-11-08 ENCOUNTER — TELEPHONE (OUTPATIENT)
Dept: FAMILY MEDICINE CLINIC | Facility: CLINIC | Age: 43
End: 2018-11-08

## 2018-11-08 VITALS
DIASTOLIC BLOOD PRESSURE: 70 MMHG | TEMPERATURE: 98 F | WEIGHT: 185.4 LBS | HEIGHT: 66 IN | SYSTOLIC BLOOD PRESSURE: 118 MMHG | RESPIRATION RATE: 18 BRPM | BODY MASS INDEX: 29.8 KG/M2 | HEART RATE: 62 BPM

## 2018-11-08 DIAGNOSIS — E66.3 OVERWEIGHT: ICD-10-CM

## 2018-11-08 DIAGNOSIS — K21.9 GASTROESOPHAGEAL REFLUX DISEASE, ESOPHAGITIS PRESENCE NOT SPECIFIED: ICD-10-CM

## 2018-11-08 DIAGNOSIS — R63.5 ABNORMAL WEIGHT GAIN: Primary | ICD-10-CM

## 2018-11-08 PROCEDURE — 99204 OFFICE O/P NEW MOD 45 MIN: CPT | Performed by: PHYSICIAN ASSISTANT

## 2018-11-08 NOTE — PROGRESS NOTES
Assessment/Plan:    Overweight  -Discussed options of [facility]-Intensive Lifestyle Intervention Program, Very Low Calorie Diet-VLCD and Conservative Program and the role of weight loss medications   -Initial weight loss goal of 5-10% weight loss for improved health  -Screening labs: reviewed labs from 6/2018  -Patient is interested in pursuing [facility]    GERD (gastroesophageal reflux disease)  -Takes omeprazole daily  If avoids triggers does not have symptoms  Reports she has symptoms daily due to not avoiding triggers, consuming 32 oz coffee per day  -suggested small, frequent meals, avoiding supine position for 3-4 hour after last meal  Reports had EGD ~10 years ago  Follow up PCP to discuss increasing dose or changing to protonix  -may improve with weight loss     Goals:    Food log (ie ) www myfitnesspal com,sparkpeople  com,loseit com,calorieking  com,etc    No sugary beverages  At least 64oz of water daily  Increase physical activity by 10 minutes daily  Gradually increase physical activity to a goal of 5 days per week for 30 minutes of MODERATE intensity PLUS 2 days per week of FULL BODY resistance training      Follow up in approximately 1 week with Non-Surgical Dietician and 14 weeks at completion of program with PA-C    Diagnoses and all orders for this visit:    Abnormal weight gain  Comments:  see plan under overweight    Overweight    Gastroesophageal reflux disease, esophagitis presence not specified          Subjective:   Chief Complaint   Patient presents with    Consult     Pt is in the office for mwm consult  Patient ID: Natalia Braun  is a 41 y o  female with excess weight/obesity here to pursue weight managment      Past Medical History:   Diagnosis Date    Asthma          HPI:  Obesity/Excess Weight:  Severity: Mild  Onset: over past year, reports gaining 30 lbs   Modifiers: Diet and Exercise and doesn't eat past a certain time, avoid fried foods, limited carbs, beach body  Contributing factors: Insufficient Physical Activity  Associated symptoms: fatigue    Goals: 135 lbs -140 lbs, to fit into clothes    B: coffee +  To go bar or Oatmeal + cranberries or skips   Snack: fruit or nuts or skips  L: skips 3-4x per week or soup or eggs+homefries+wheat toast  Snack: skips  D: seafood + rice + salad, or protein + rice/beans  S: cheese or corn chips +/- chelsie    Fluid: water 48 oz, 32oz coffee + Gambian vanilla creamer + cream, 4oz wine nightly   Exercise: none currently due to shoulder injury, likes to play hand ball    The following portions of the patient's history were reviewed and updated as appropriate: allergies, current medications, past family history, past medical history, past social history, past surgical history and problem list     Review of Systems   Constitutional: Negative for chills and fever  HENT: Negative for sore throat  Respiratory: Negative for cough and shortness of breath  Cardiovascular: Negative for chest pain and palpitations  Gastrointestinal: Positive for constipation and nausea (when eats trigger foods)  Negative for abdominal pain, diarrhea and vomiting  Hx gastritis, GERD   Genitourinary: Negative for dysuria  Musculoskeletal: Positive for arthralgias (right shoulder pain)  Skin: Negative for rash  Neurological: Negative for dizziness and headaches  Psychiatric/Behavioral:        Denies depression       Objective:    /70 (BP Location: Left arm, Patient Position: Sitting, Cuff Size: Large)   Pulse 62   Temp 98 °F (36 7 °C) (Tympanic)   Resp 18   Ht 5' 6" (1 676 m)   Wt 84 1 kg (185 lb 6 4 oz)   BMI 29 92 kg/m²     Physical Exam   Nursing note and vitals reviewed  Constitutional   General appearance: Abnormal   well developed and overweight  Eyes No conjunctival pallor  Ears, Nose, Mouth, and Throat Oral mucosa moist    Pulmonary   Respiratory effort: No increased work of breathing or signs of respiratory distress  Auscultation of lungs: Clear to auscultation, equal breath sounds bilaterally, no wheezes, no rales, no rhonci  Cardiovascular   Auscultation of heart: Normal rate and rhythm, normal S1 and S2, without murmurs  Examination of extremities for edema and/or varicosities: Normal   no edema  Abdomen   Abdomen: Abnormal   Bowel sounds were normal  The abdomen was soft and nontender     Musculoskeletal   Gait and station: Normal     Psychiatric   Orientation to person, place and time: Normal     Affect: appropriate

## 2018-11-08 NOTE — ASSESSMENT & PLAN NOTE
-Discussed options of [facility]-Intensive Lifestyle Intervention Program, Very Low Calorie Diet-VLCD and Conservative Program and the role of weight loss medications   -Initial weight loss goal of 5-10% weight loss for improved health  -Screening labs: reviewed labs from 6/2018  -Patient is interested in pursuing [facility]

## 2018-11-08 NOTE — PATIENT INSTRUCTIONS
Goals: Food log (ie ) www myfitnesspal com,sparkpeople  com,loseit com,calorieking  com,etc    No sugary beverages  At least 64oz of water daily  Increase physical activity by 10 minutes daily   Gradually increase physical activity to a goal of 5 days per week for 30 minutes of MODERATE intensity PLUS 2 days per week of FULL BODY resistance training

## 2018-11-08 NOTE — ASSESSMENT & PLAN NOTE
-Takes omeprazole daily  If avoids triggers does not have symptoms  Reports she has symptoms daily due to not avoiding triggers, consuming 32 oz coffee per day  -suggested small, frequent meals, avoiding supine position for 3-4 hour after last meal  Reports had EGD ~10 years ago   Follow up PCP to discuss increasing dose or changing to protonix  -may improve with weight loss

## 2018-11-08 NOTE — TELEPHONE ENCOUNTER
Pt called stating she has an appt with a [facility] Nutritionist today 11/8 @ 115 pm  She verified with her insurance of anything needed and they require an letter of medical necessity  Would you be able to generate this letter? She is seeing them due to elevated cholesterol and weight gain  She noted she is now 193 lbs  She wants "to be able to get this under control before it gets a lot worse"

## 2018-11-09 NOTE — TELEPHONE ENCOUNTER
The letter has been drawn up  A copy is ready to be picked up and/or faxed  *I left a message for the pt to call back to find out if she would like it faxed (where to) or picked up  The letter is in the  the front office

## 2018-11-19 ENCOUNTER — OFFICE VISIT (OUTPATIENT)
Dept: BARIATRICS | Facility: CLINIC | Age: 43
End: 2018-11-19

## 2018-11-19 VITALS — HEIGHT: 66 IN | BODY MASS INDEX: 30.02 KG/M2 | WEIGHT: 186.8 LBS

## 2018-11-19 DIAGNOSIS — R63.5 ABNORMAL WEIGHT GAIN: ICD-10-CM

## 2018-11-19 PROCEDURE — RECHECK: Performed by: DIETITIAN, REGISTERED

## 2018-11-19 PROCEDURE — WMPRO12: Performed by: DIETITIAN, REGISTERED

## 2018-11-19 NOTE — PROGRESS NOTES
Weight Management Medical Nutrition Assessment  Elise presented for the New Start session with the [facility] Program   Today's weight is 186 8#   Per dietary recall patient consumes excess calories from dining out and consuming larger portions of dinner meal  She skips breakfast daily and consumes excess calories from cream in coffee  Developed a meal plan using a protein shake for am meal and reducing caloric value of lunch and dinner  Anthropometric Measurements  Start Weight (lbs): 186 8#  Goal Weight (lbs):166#     Weight Loss History  Previous weight loss attempts: Commercial Programs ([employer], [employer], etc )  [employer]- protein shake     Food and Nutrition Related History    Food Recall  Travel for work - see clients in home setting  [address] / coffee with creamer - diner on weekends- 2 eggs/ toast/ duncan  Snack:skip  Lunch:skip or drive through [employer] -4 inches [place] spicy [employer] sub/ chips  [place]- Salad with grilled market salad 535 / 310 dressing  Zoups- almond salad   Snack:chocolate dark   Dinner:wine / cheese aged   Lean protein - seafood or chicken / salad / rice or carb - larger portions   Snack:skip or fruit salad       Beverages: water and coffee/tea, juice   Volume of beverage intake: 40-50oz     Weekends: Same  Cravings: sweet or salty  Trouble area of day:dinner - larger if skip    Frequency of Eating out: every 3 days  Food restrictions:none  Cooking: self   Food Shopping: self    Physical Activity Intake  Activity:Hand ball - membership at gym - cardio  Frequency:infrequently  Physical limitations/barriers to exercise: none     Estimated Needs  Energy    [employer] Needs: BMR : 1529 calories   1-2# loss weekly sedentary:  835-1335 calories            1-2# loss weekly lightly active:0115-0161 calories  Protein:71-89gm     (1 2-1 5g/kg IBW)  Fluid: 69oz     (35mL/kg IBW)    Nutrition Diagnosis  Yes;     Overweight/obesity  related to Excess energy intake as evidenced by  BMI more than normative standard for age and sex (obesity-grade I 26-30  9)       Nutrition Intervention    Nutrition Prescription  Calories:1200 calories on sedentary days and flex to 1400 calories for gym days  Protein:70-90gm protein daily   Fluid:70oz water    Meal Plan  Breakfast:Whey Protein shake   Snack:coffee (120)  Lunch:300/20/30  Snack: wine/ cheese(230)  Dinner:400/30/30  Snack:    Nutrition Education:    Healthy [facility] Manual  Calorie controlled menu  Lean protein food choices  Healthy snack options  Food journaling tips      Nutrition Counseling:  Strategies: meal planning, portion sizes, healthy snack choices, hydration, fiber intake, protein intake, exercise, food journal      Monitoring and Evaluation:  Evaluation criteria:  Energy Intake  Meet protein needs  Maintain adequate hydration  Monitor weekly weight  Meal planning/preparation  Food journal   Decreased portions at mealtimes and snacks  Physical activity     Barriers to learning:none  Readiness to change: Action  Comprehension: good  Expected Compliance: good

## 2018-11-29 ENCOUNTER — CLINICAL SUPPORT (OUTPATIENT)
Dept: BARIATRICS | Facility: CLINIC | Age: 43
End: 2018-11-29

## 2018-11-29 VITALS — BODY MASS INDEX: 29.67 KG/M2 | HEIGHT: 66 IN | WEIGHT: 184.6 LBS

## 2018-11-29 DIAGNOSIS — R63.5 ABNORMAL WEIGHT GAIN: Primary | ICD-10-CM

## 2018-11-29 PROCEDURE — RECHECK

## 2018-12-07 ENCOUNTER — OFFICE VISIT (OUTPATIENT)
Dept: FAMILY MEDICINE CLINIC | Facility: CLINIC | Age: 43
End: 2018-12-07
Payer: COMMERCIAL

## 2018-12-07 VITALS
BODY MASS INDEX: 29.51 KG/M2 | WEIGHT: 183.6 LBS | HEIGHT: 66 IN | TEMPERATURE: 97.3 F | SYSTOLIC BLOOD PRESSURE: 122 MMHG | DIASTOLIC BLOOD PRESSURE: 90 MMHG

## 2018-12-07 DIAGNOSIS — J04.0 LARYNGITIS: ICD-10-CM

## 2018-12-07 DIAGNOSIS — J40 BRONCHITIS: Primary | ICD-10-CM

## 2018-12-07 PROCEDURE — 3008F BODY MASS INDEX DOCD: CPT | Performed by: FAMILY MEDICINE

## 2018-12-07 PROCEDURE — 99213 OFFICE O/P EST LOW 20 MIN: CPT | Performed by: FAMILY MEDICINE

## 2018-12-07 RX ORDER — AMOXICILLIN 500 MG/1
1000 CAPSULE ORAL EVERY 12 HOURS SCHEDULED
Qty: 28 CAPSULE | Refills: 0 | Status: SHIPPED | OUTPATIENT
Start: 2018-12-07 | End: 2018-12-14

## 2018-12-07 NOTE — PROGRESS NOTES
Assessment/Plan:  Patient will try to rest voice  Patient use cold liquids and lozenges  Diagnoses and all orders for this visit:    Bronchitis    Laryngitis  -     amoxicillin (AMOXIL) 500 mg capsule; Take 2 capsules (1,000 mg total) by mouth every 12 (twelve) hours for 7 days          Subjective:      Patient ID: Sal Flores is a 41 y o  female  Patient is here with sore throat/scratchy throat over the past week which is progressively getting worse  Patient now with significant hoarseness  Patient also coughing and has some green sputum production which is worse at night and in the early morning  The patient did use the the herbal over-the-counter medication  No vomiting or diarrhea  No fever noted  The following portions of the patient's history were reviewed and updated as appropriate: allergies, current medications, past family history, past medical history, past social history, past surgical history and problem list     Review of Systems   Constitutional: Negative  Negative for fever  HENT: Positive for congestion, postnasal drip, rhinorrhea and sore throat  Eyes: Negative  Respiratory: Positive for cough  Cardiovascular: Negative  Gastrointestinal: Negative  Endocrine: Negative  Genitourinary: Negative  Musculoskeletal: Negative  Skin: Negative  Allergic/Immunologic: Negative  Neurological: Negative  Negative for headaches  Hematological: Negative  Psychiatric/Behavioral: Negative  Objective:      /90 (BP Location: Right arm, Patient Position: Sitting, Cuff Size: Adult)   Temp (!) 97 3 °F (36 3 °C) (Tympanic)   Ht 5' 6 25" (1 683 m)   Wt 83 3 kg (183 lb 9 6 oz)   LMP 11/22/2018 (Exact Date)   BMI 29 41 kg/m²          Physical Exam   Constitutional: She is oriented to person, place, and time  She appears well-developed and well-nourished  No distress  HENT:   Head: Normocephalic     Right Ear: External ear normal    Left Ear: External ear normal    Mouth/Throat: Oropharyngeal exudate present  Eyes: Pupils are equal, round, and reactive to light  EOM are normal  Right eye exhibits no discharge  Left eye exhibits no discharge  No scleral icterus  Neck: Normal range of motion  Neck supple  No thyromegaly present  Cardiovascular: Normal rate, regular rhythm, normal heart sounds and intact distal pulses  Exam reveals no gallop and no friction rub  No murmur heard  Pulmonary/Chest: Effort normal  No respiratory distress  She has no wheezes  She has no rales  She exhibits no tenderness  Small amount a rhonchi in the right base   Abdominal: Soft  Bowel sounds are normal  She exhibits no distension  There is no tenderness  There is no rebound and no guarding  Musculoskeletal: Normal range of motion  She exhibits no edema or tenderness  Lymphadenopathy:     She has no cervical adenopathy  Neurological: She is oriented to person, place, and time  No cranial nerve deficit  She exhibits normal muscle tone  Coordination normal    Skin: Skin is warm and dry  No rash noted  She is not diaphoretic  No erythema  No pallor  Psychiatric: She has a normal mood and affect  Her behavior is normal  Judgment and thought content normal    Nursing note and vitals reviewed

## 2018-12-13 ENCOUNTER — CLINICAL SUPPORT (OUTPATIENT)
Dept: BARIATRICS | Facility: CLINIC | Age: 43
End: 2018-12-13

## 2018-12-13 VITALS — HEIGHT: 66 IN | WEIGHT: 181.6 LBS | BODY MASS INDEX: 29.18 KG/M2

## 2018-12-13 DIAGNOSIS — R63.5 ABNORMAL WEIGHT GAIN: Primary | ICD-10-CM

## 2018-12-13 PROCEDURE — RECHECK

## 2018-12-20 ENCOUNTER — CLINICAL SUPPORT (OUTPATIENT)
Dept: BARIATRICS | Facility: CLINIC | Age: 43
End: 2018-12-20

## 2018-12-20 VITALS — HEIGHT: 66 IN | WEIGHT: 179.2 LBS | BODY MASS INDEX: 28.8 KG/M2

## 2018-12-20 DIAGNOSIS — R63.5 ABNORMAL WEIGHT GAIN: Primary | ICD-10-CM

## 2018-12-20 PROCEDURE — RECHECK

## 2018-12-21 ENCOUNTER — OFFICE VISIT (OUTPATIENT)
Dept: BARIATRICS | Facility: CLINIC | Age: 43
End: 2018-12-21

## 2018-12-21 VITALS — WEIGHT: 177.9 LBS | BODY MASS INDEX: 28.59 KG/M2 | HEIGHT: 66 IN

## 2018-12-21 DIAGNOSIS — R63.5 ABNORMAL WEIGHT GAIN: Primary | ICD-10-CM

## 2018-12-21 PROCEDURE — RECHECK: Performed by: DIETITIAN, REGISTERED

## 2018-12-21 NOTE — PROGRESS NOTES
Weight Management Medical Nutrition Assessment  Elise presented for the 2 week follow-up session  with the [email or web address] [facility]   Today's weight is 177 9#  She has lost 8 9# (5% TBW) in the past month  has made many dietary changes including interval eating, reducing portion sizes at snacks and meals, and food journaling  She stated she is meeting protein needs of 70 gm and averaging 1100 calorie range  Discussed upcoming holiday meal plans  Anthropometric Measurements  Start Weight (lbs): 186 8#  Today's weight: 177 9#  % TBW loss from start:5%  Goal Weight (lbs):166#      Weight Loss History  Previous weight loss attempts: Commercial Programs ([employer], Mehreen Hemal, etc )  [employer]- protein shake      Food and Nutrition Related History     Food Recall  Meal Plan  Breakfast:50/50 togo yogurt / green tea or coffee  Snack:  Lunch:zoups - cranberry almond somona 1/2 salad 300 calories   Snack:fruit  Dinner:smaller portions - veggies/ carbs / protein source   Snack:small candy or coconut bar from [employer]        Beverages: water/ Hint water/  2 glasses of wine  Volume of beverage intake: 55 oz      Weekends: Same  Cravings: sweet or salty  Trouble area of day:dinner - larger if skip     Frequency of Eating out: lunches and dinner 1-2  Days week  Food restrictions:none  Cooking: self   Food Shopping: self     Physical Activity Intake  Activity:walking 5,000   Frequency:infrequently  Physical limitations/barriers to exercise: none      Estimated Needs  Energy     [employer] [place] Energy Needs: BMR :1489  calories   1# loss weekly sedentary: 1286  calories            1# loss weekly lightly active: 1547 calories  Protein:71-89gm     (1 2-1 5g/kg IBW)  Fluid: 69oz     (35mL/kg IBW)     Nutrition Diagnosis  Yes; Overweight/obesity  related to Excess energy intake as evidenced by  BMI more than normative standard for age and sex (obesity-grade I 26-30  9)     Nutrition Intervention     Nutrition Prescription  Calories:1200 calories on sedentary days and flex to 1400 calories for gym days  Protein:70-90gm protein daily   Fluid:70oz water     Meal Plan  Breakfast:Whey Protein shake             Snack:coffee (120)  Lunch:300/20/30  Snack: wine/ cheese(230)  Dinner:400/30/30  Snack:     Nutrition Education:    Healthy Core Manual  Calorie controlled menu  Lean protein food choices  Healthy snack options  Food journaling tips        Nutrition Counseling:  Strategies: meal planning, portion sizes, healthy snack choices, hydration, fiber intake, protein intake, exercise, food journal        Monitoring and Evaluation:  Evaluation criteria:  Energy Intake  Meet protein needs  Maintain adequate hydration  Monitor weekly weight  Meal planning/preparation  Food journal   Decreased portions at mealtimes and snacks  Physical activity      Barriers to learning:none  Readiness to change: Action  Comprehension: good  Expected Compliance: good

## 2018-12-27 ENCOUNTER — CLINICAL SUPPORT (OUTPATIENT)
Dept: BARIATRICS | Facility: CLINIC | Age: 43
End: 2018-12-27

## 2018-12-27 VITALS — BODY MASS INDEX: 28.73 KG/M2 | HEIGHT: 66 IN | WEIGHT: 178.8 LBS

## 2018-12-27 DIAGNOSIS — R63.5 ABNORMAL WEIGHT GAIN: Primary | ICD-10-CM

## 2018-12-27 PROCEDURE — RECHECK

## 2019-01-16 ENCOUNTER — OFFICE VISIT (OUTPATIENT)
Dept: URGENT CARE | Age: 44
End: 2019-01-16
Payer: COMMERCIAL

## 2019-01-16 VITALS
OXYGEN SATURATION: 100 % | HEIGHT: 66 IN | WEIGHT: 178 LBS | RESPIRATION RATE: 16 BRPM | HEART RATE: 66 BPM | DIASTOLIC BLOOD PRESSURE: 80 MMHG | TEMPERATURE: 97.8 F | BODY MASS INDEX: 28.61 KG/M2 | SYSTOLIC BLOOD PRESSURE: 137 MMHG

## 2019-01-16 DIAGNOSIS — G44.209 MUSCLE TENSION HEADACHE: Primary | ICD-10-CM

## 2019-01-16 PROCEDURE — 99213 OFFICE O/P EST LOW 20 MIN: CPT | Performed by: FAMILY MEDICINE

## 2019-01-16 RX ORDER — KETOROLAC TROMETHAMINE 30 MG/ML
60 INJECTION, SOLUTION INTRAMUSCULAR; INTRAVENOUS ONCE
Status: COMPLETED | OUTPATIENT
Start: 2019-01-16 | End: 2019-01-16

## 2019-01-16 RX ORDER — DICLOFENAC SODIUM 75 MG/1
75 TABLET, DELAYED RELEASE ORAL 2 TIMES DAILY
Qty: 20 TABLET | Refills: 0 | Status: SHIPPED | OUTPATIENT
Start: 2019-01-16 | End: 2019-01-31 | Stop reason: ALTCHOICE

## 2019-01-16 RX ORDER — METHOCARBAMOL 500 MG/1
500 TABLET, FILM COATED ORAL
Qty: 10 TABLET | Refills: 0 | Status: SHIPPED | OUTPATIENT
Start: 2019-01-16 | End: 2019-06-10

## 2019-01-16 RX ORDER — CETIRIZINE HYDROCHLORIDE 10 MG/1
10 TABLET ORAL DAILY
COMMUNITY
End: 2019-04-10 | Stop reason: SDUPTHER

## 2019-01-16 RX ADMIN — KETOROLAC TROMETHAMINE 60 MG: 30 INJECTION, SOLUTION INTRAMUSCULAR; INTRAVENOUS at 12:39

## 2019-01-16 NOTE — PATIENT INSTRUCTIONS
Patient received Toradol 60 milligram IM in the office  I prescribed Robaxin 500 milligram q h s  p r n , diclofenac 75 milligram b i d  p r n     Advised to apply heating pad to affected area  Follow-up with PCP symptoms persist or worsen  Tension Headache   WHAT YOU NEED TO KNOW:   Tension headaches are most often caused by stress, eye strain, or muscle tightness  The pain of a tension headache may start in the forehead or the back of the head  The pain often spreads over the whole head and down into the neck and shoulders  Over-the-counter pain medicine is the most useful and common treatment for a tension headache  Exercise, biofeedback, meditation, or relaxation techniques may also decrease your headache pain  DISCHARGE INSTRUCTIONS:   Return to the emergency department if:   · You have a sudden headache that seems different or much worse than your usual headaches  · You have difficulty seeing, speaking, or moving  · You pass out, become confused, or have a seizure  · You have a headache, fever, and a stiff neck  Contact your healthcare provider if:   · Your headaches continue to get worse  · Your headaches happen so often that they affect your ability to do your work or normal activities  · You need to take medicine to help your headaches more often than your healthcare provider says you should  · Your headaches get so bad that they cause you to vomit  · You have questions or concerns about your condition or care  Medicines:   · NSAIDs , such as ibuprofen, help decrease swelling, pain, and fever  This medicine is available with or without a doctor's order  NSAIDs can cause stomach bleeding or kidney problems in certain people  If you take blood thinner medicine, always ask your healthcare provider if NSAIDs are safe for you  Always read the medicine label and follow directions  · Acetaminophen  decreases pain and fever  It is available without a doctor's order   Ask how much to take and how often to take it  Follow directions  Read the labels of all other medicines you are using to see if they also contain acetaminophen, or ask your doctor or pharmacist  Acetaminophen can cause liver damage if not taken correctly  Do not use more than 4 grams (4,000 milligrams) total of acetaminophen in one day  · Take your medicine as directed  Contact your healthcare provider if you think your medicine is not helping or if you have side effects  Tell him of her if you are allergic to any medicine  Keep a list of the medicines, vitamins, and herbs you take  Include the amounts, and when and why you take them  Bring the list or the pill bottles to follow-up visits  Carry your medicine list with you in case of an emergency  Manage your symptoms:   · Keep a headache record  Include when the headaches start and stop and what made them better  Describe your symptoms, such as how the pain feels, where it is, and how bad it is  Record anything you ate or drank for the past 24 hours before your headache  Bring this to follow-up visits  · Apply heat as directed  Heat may help decrease headache pain and muscle spasms  Apply heat on the area for 20 to 30 minutes every 2 hours for as many days as directed  A warm bath may also help relieve muscle tension and spasms  · Apply ice as directed  Ice may help decrease headache pain  Use an ice pack or put crushed ice in a plastic bag  Cover it with a towel and place it on the area for 15 to 20 minutes every hour as directed  Prevent tension headaches:   · Avoid muscle tension  Do not stay in one position for long periods of time  Use a different pillow if you wake up with sore neck and shoulder muscles  Find ways to relax your muscles, such as massage or resting in a quiet, dark room  · Avoid eye strain  Make sure you have good lighting when you read, sew, or do similar activities  Get yearly eye exams and wear glasses as directed      · Get enough sleep   Get 8 to 10 hours of sleep each night  Create a sleep schedule  Go to bed and wake up at the same times each day  It may be helpful to do something relaxing before bed  Do not watch television right before bed  · Eat a variety of healthy foods  Healthy foods include fruits, vegetables, whole-grain breads, low-fat dairy products, beans, lean meats, and fish  Do not eat foods that trigger your headaches  · Exercise regularly  Exercise helps decrease stress and headaches  Ask about the best exercise plan for you  · Drink liquids as directed  You may need to drink more liquid to prevent dehydration  Dehydration can make a tension headache worse  Ask your healthcare provider how much liquid to drink and which liquids are best for you  Limit caffeine as directed  Caffeine may make a tension headache worse  · Do not drink alcohol  Alcohol can trigger a headache  It can also prevent medicines from stopping your headache  · Do not smoke  Nicotine and other chemicals in cigarettes and cigars can trigger a headache and also cause lung damage  Ask your healthcare provider for information if you currently smoke and need help to quit  E-cigarettes or smokeless tobacco still contain nicotine  Talk to your healthcare provider before you use these products  Follow up with your healthcare provider as directed:  Bring the headache record with you  Write down your questions so you remember to ask them during your visits  © 2017 [employer] Information is for End User's use only and may not be sold, redistributed or otherwise used for commercial purposes  All illustrations and images included in CareNotes® are the copyrighted property of A D [employer] M , [employer]  or [employer]  The above information is an  only  It is not intended as medical advice for individual conditions or treatments   Talk to your doctor, nurse or pharmacist before following any medical regimen to see if it is safe and effective for you

## 2019-01-16 NOTE — PROGRESS NOTES
[facility]        NAME: Yisel Pérez is a 41 y o  female  : 1975    MRN: [id number]  DATE: 2019  TIME: 12:43 PM    Assessment and Plan   Muscle tension headache [G44 209]  1  Muscle tension headache  ketorolac (TORADOL) injection 60 mg    methocarbamol (ROBAXIN) 500 mg tablet    diclofenac (VOLTAREN) 75 mg EC tablet         Patient Instructions       Follow up with PCP in 3-5 days  Proceed to  ER if symptoms worsen  Chief Complaint     Chief Complaint   Patient presents with    Headache     Pt c/o right-sided posterior head pain since Saturday, radiates into right shoulder  Pt states she tried OTC meds, hot showers, and essential oils without relief  History of Present Illness       Patient complaining of right-sided headache for the last 5 days  Pain is predominant located in the base of the skull reading up into the scalp and traveling down her shoulder  Denies any numbness weakness of the upper extremity  She has a known history of migraine which she does not believe the symptoms are related  Denies light sensitivity or sound sensitivity  Denies nausea  She has been taking some over-the-counter ibuprofen applying heat and applying essential oils with no noticeable improvement  Denies visual disturbance  Denies any stiffness of her neck  She describes the symptoms are worse when she lies supine  She also describes having some slight tenderness upon palpation of the right temporal area  Review of Systems   Review of Systems   Constitutional: Negative  Eyes: Negative  Musculoskeletal: Negative  Neurological: Positive for headaches           Current Medications       Current Outpatient Prescriptions:     cetirizine (ZyrTEC) 10 mg tablet, Take 10 mg by mouth daily, Disp: , Rfl:     fluticasone (FLONASE) 50 mcg/act nasal spray, 1 spray into each nostril 2 (two) times a day, Disp: 16 g, Rfl: 0    omeprazole (PriLOSEC) 20 mg delayed release capsule, Take 1 capsule by mouth daily, Disp: , Rfl:     valACYclovir (VALTREX) 1,000 mg tablet, Take 2 tablets by mouth as needed  , Disp: , Rfl:     diclofenac (VOLTAREN) 75 mg EC tablet, Take 1 tablet (75 mg total) by mouth 2 (two) times a day for 10 days, Disp: 20 tablet, Rfl: 0    methocarbamol (ROBAXIN) 500 mg tablet, Take 1 tablet (500 mg total) by mouth daily at bedtime as needed for muscle spasms for up to 10 days, Disp: 10 tablet, Rfl: 0  No current facility-administered medications for this visit  Current Allergies     Allergies as of 01/16/2019 - Reviewed 01/16/2019   Allergen Reaction Noted    Prednisone Facial Swelling 06/07/2018            The following portions of the patient's history were reviewed and updated as appropriate: allergies, current medications, past family history, past medical history, past social history, past surgical history and problem list      Past Medical History:   Diagnosis Date    Asthma        Past Surgical History:   Procedure Laterality Date    KNEE SURGERY      TUBAL LIGATION         Family History   Problem Relation Age of Onset    Diabetes Father     Breast cancer Maternal Grandmother     Asthma Family         CHRONIC    Thyroid disease Mother     Diabetes Paternal Grandmother         Lung CA    Heart disease Neg Hx          Medications have been verified  Objective   /80   Pulse 66   Temp 97 8 °F (36 6 °C) (Tympanic)   Resp 16   Ht 5' 6" (1 676 m)   Wt 80 7 kg (178 lb)   LMP 01/07/2019   SpO2 100%   BMI 28 73 kg/m²        Physical Exam     Physical Exam   Constitutional: She is oriented to person, place, and time  HENT:   Head: Normocephalic  Eyes: Pupils are equal, round, and reactive to light  EOM are normal    Neck: Normal range of motion  Musculoskeletal: Normal range of motion  She exhibits tenderness  tenderness upon palpation of the bicipital area, right trapezius muscle     Neurological: She is alert and oriented to person, place, and time  No cranial nerve deficit  Nursing note and vitals reviewed

## 2019-01-16 NOTE — LETTER
January 16, 2019     Patient: Ellen Dumont   YOB: 1975   Date of Visit: 1/16/2019       To Whom It May Concern: It is my medical opinion that Ellen Dumont may return to work on 1/17/2019  If you have any questions or concerns, please don't hesitate to call           Sincerely,        Lilliana Carbone MD    CC: No Recipients

## 2019-01-31 ENCOUNTER — OFFICE VISIT (OUTPATIENT)
Dept: FAMILY MEDICINE CLINIC | Facility: CLINIC | Age: 44
End: 2019-01-31
Payer: COMMERCIAL

## 2019-01-31 ENCOUNTER — TELEPHONE (OUTPATIENT)
Dept: FAMILY MEDICINE CLINIC | Facility: CLINIC | Age: 44
End: 2019-01-31

## 2019-01-31 VITALS
WEIGHT: 177.6 LBS | DIASTOLIC BLOOD PRESSURE: 70 MMHG | SYSTOLIC BLOOD PRESSURE: 104 MMHG | HEART RATE: 66 BPM | OXYGEN SATURATION: 99 % | BODY MASS INDEX: 28.54 KG/M2 | HEIGHT: 66 IN | RESPIRATION RATE: 12 BRPM

## 2019-01-31 DIAGNOSIS — M79.604 PAIN IN BOTH LOWER EXTREMITIES: Primary | ICD-10-CM

## 2019-01-31 DIAGNOSIS — B00.1 FEVER BLISTER: ICD-10-CM

## 2019-01-31 DIAGNOSIS — H04.123 DRY EYES: ICD-10-CM

## 2019-01-31 DIAGNOSIS — G25.81 RESTLESS LEG SYNDROME: ICD-10-CM

## 2019-01-31 DIAGNOSIS — M79.605 PAIN IN BOTH LOWER EXTREMITIES: Primary | ICD-10-CM

## 2019-01-31 PROBLEM — J06.9 URTI (ACUTE UPPER RESPIRATORY INFECTION): Status: RESOLVED | Noted: 2018-03-27 | Resolved: 2019-01-31

## 2019-01-31 PROBLEM — J01.00 ACUTE NON-RECURRENT MAXILLARY SINUSITIS: Status: RESOLVED | Noted: 2018-01-26 | Resolved: 2019-01-31

## 2019-01-31 PROCEDURE — 3008F BODY MASS INDEX DOCD: CPT | Performed by: FAMILY MEDICINE

## 2019-01-31 PROCEDURE — 99214 OFFICE O/P EST MOD 30 MIN: CPT | Performed by: FAMILY MEDICINE

## 2019-01-31 RX ORDER — VALACYCLOVIR HYDROCHLORIDE 1 G/1
2000 TABLET, FILM COATED ORAL AS NEEDED
Qty: 15 TABLET | Refills: 3 | Status: SHIPPED | OUTPATIENT
Start: 2019-01-31 | End: 2019-05-01

## 2019-01-31 RX ORDER — GABAPENTIN 100 MG/1
100 CAPSULE ORAL 3 TIMES DAILY
Qty: 90 CAPSULE | Refills: 2 | Status: SHIPPED | OUTPATIENT
Start: 2019-01-31 | End: 2019-06-10

## 2019-01-31 RX ORDER — NAPROXEN 500 MG/1
500 TABLET ORAL 2 TIMES DAILY WITH MEALS
Qty: 60 TABLET | Refills: 0 | Status: SHIPPED | OUTPATIENT
Start: 2019-01-31 | End: 2019-08-08

## 2019-01-31 NOTE — PROGRESS NOTES
Assessment/Plan:    Follow-up if not a lot better in 2-4 weeks  Diagnoses and all orders for this visit:    Pain in both lower extremities  -     naproxen (NAPROSYN) 500 mg tablet; Take 1 tablet (500 mg total) by mouth 2 (two) times a day with meals for 30 days    Fever blister  -     valACYclovir (VALTREX) 1,000 mg tablet; Take 2 tablets (2,000 mg total) by mouth as needed (fever blister) for up to 90 days    Dry eyes  -     hypromellose (ARTIFICIAL TEARS) 0 4 % SOLN; Administer 1 drop to both eyes 4 (four) times a day as needed for dry eyes for up to 90 days    Restless leg syndrome  -     gabapentin (NEURONTIN) 100 mg capsule; Take 1 capsule (100 mg total) by mouth 3 (three) times a day for 90 days          Subjective:      Patient ID: Eliza Bridges is a 41 y o  female  Patient presents with:  Leg Pain: Patient presents to the office today for c/o bilateral leg pain at night that is causing her to not be able to sleep  Mass: Patient also with c/o a lump at the sight of a Toradol injection that she got two weeks ago for a tension headache at Urgent Care  Rash: Patient also with c/o a possible rash from her glasses on her nose/face  Medication Refill: Valcyclovir to [facility]  Dry Eye: Patient also with c/o dry eye and would like a script for this  Leg Pain      Rash     Medication Refill   Associated symptoms include joint swelling, myalgias and a rash  The following portions of the patient's history were reviewed and updated as appropriate: allergies, current medications, past family history, past medical history, past social history, past surgical history and problem list     Review of Systems   Constitutional: Negative  HENT: Negative  Eyes: Negative  Respiratory: Negative  Cardiovascular: Negative  Gastrointestinal: Negative  Endocrine: Negative  Genitourinary: Negative  Musculoskeletal: Positive for gait problem, joint swelling and myalgias     Skin: Positive for rash  Allergic/Immunologic: Negative  Hematological: Negative  Psychiatric/Behavioral: Negative  All other systems reviewed and are negative  Objective:      /70 (BP Location: Left arm, Patient Position: Sitting, Cuff Size: Large)   Pulse 66   Resp 12   Ht 5' 6" (1 676 m)   Wt 80 6 kg (177 lb 9 6 oz)   LMP 01/07/2019   SpO2 99%   BMI 28 67 kg/m²          Physical Exam   Constitutional: She is oriented to person, place, and time  She appears well-developed and well-nourished  HENT:   Head: Normocephalic and atraumatic  Right Ear: External ear normal    Left Ear: External ear normal    Nose: Nose normal    Mouth/Throat: Oropharynx is clear and moist    Eyes: Pupils are equal, round, and reactive to light  Conjunctivae and EOM are normal    Neck: Normal range of motion  Neck supple  Cardiovascular: Normal rate, regular rhythm and normal heart sounds  Pulmonary/Chest: Effort normal and breath sounds normal    Abdominal: Soft  Bowel sounds are normal    Musculoskeletal: Normal range of motion  Neurological: She is alert and oriented to person, place, and time  She has normal reflexes  Skin: Skin is warm and dry  Psychiatric: She has a normal mood and affect  Her behavior is normal    Nursing note and vitals reviewed

## 2019-01-31 NOTE — LETTER
January 31, 2019     Patient: Ellen Dumont   YOB: 1975   Date of Visit: 1/31/2019       To Whom it May Concern:    Ellen Dumont is under my professional care  She was seen in my office on 1/31/2019  She may return to work on Friday February 1, 2019  If you have any questions or concerns, please don't hesitate to call           Sincerely,          Qi Kevin DO        CC: No Recipients

## 2019-02-27 ENCOUNTER — OFFICE VISIT (OUTPATIENT)
Dept: URGENT CARE | Age: 44
End: 2019-02-27
Payer: COMMERCIAL

## 2019-02-27 VITALS
DIASTOLIC BLOOD PRESSURE: 82 MMHG | HEART RATE: 67 BPM | HEIGHT: 66 IN | SYSTOLIC BLOOD PRESSURE: 138 MMHG | OXYGEN SATURATION: 100 % | RESPIRATION RATE: 16 BRPM | TEMPERATURE: 98.1 F | BODY MASS INDEX: 29.25 KG/M2 | WEIGHT: 182 LBS

## 2019-02-27 DIAGNOSIS — H65.01 RIGHT ACUTE SEROUS OTITIS MEDIA, RECURRENCE NOT SPECIFIED: Primary | ICD-10-CM

## 2019-02-27 PROCEDURE — 99213 OFFICE O/P EST LOW 20 MIN: CPT | Performed by: PHYSICIAN ASSISTANT

## 2019-02-27 RX ORDER — PSEUDOEPHEDRINE HYDROCHLORIDE 30 MG/1
30 TABLET ORAL EVERY 4 HOURS PRN
Qty: 30 TABLET | Refills: 0 | Status: SHIPPED | OUTPATIENT
Start: 2019-02-27 | End: 2019-08-08 | Stop reason: SDUPTHER

## 2019-02-27 NOTE — LETTER
February 27, 2019     Patient: Marylene Humble   YOB: 1975   Date of Visit: 2/27/2019       To Whom It May Concern: It is my medical opinion that Marylene Humble may return to work on 3/1/2019  If you have any questions or concerns, please don't hesitate to call           Sincerely,        Sera Rollins PA-C    CC: No Recipients

## 2019-02-28 NOTE — PATIENT INSTRUCTIONS
Note that ear discomfort from fluid may persist for up to one month  Fluids and rest  Tylenol/Ibuprofen for discomfort   Over the counter decongestants as needed  Follow up with PCP in 3-5 days  Proceed to  ER if symptoms worsen  Serous Otitis Media   WHAT YOU NEED TO KNOW:   Serous otitis media is fluid trapped behind your tympanic membrane (eardrum), without an ear infection  Your eardrum is in your middle ear  Serous otitis media is also called otitis media with effusion  You may have fluid in your ear for months, but it usually goes away on its own  The fluid may be in one or both ears  The fluid may cause muffled sounds, and you may feel like your ears are full  Serous otitis media may be caused by an upper respiratory infection or allergies  It is most common in the fall and early spring  DISCHARGE INSTRUCTIONS:   Return to the emergency department if:   · You have a fever  · You have a sudden loss of hearing in your affected ear  · You develop a severe headache and stiff neck  · You have a seizure  Contact your healthcare provider if:   · You have fluid draining from your ear  · You have new symptoms  · You have questions or concerns about your condition or care  Follow up with your healthcare provider as directed: Your ears will need to be checked regularly  You may need to see a specialist  Write down your questions so you remember to ask them during your visits  © 2017 [employer]  Information is for End User's use only and may not be sold, redistributed or otherwise used for commercial purposes  All illustrations and images included in CareNotes® are the copyrighted property of [employer]  or [employer]berg  The above information is an  only  It is not intended as medical advice for individual conditions or treatments   Talk to your doctor, nurse or pharmacist before following any medical regimen to see if it is safe and effective for you

## 2019-02-28 NOTE — PROGRESS NOTES
[facility]        NAME: Ramírez Odell is a 41 y o  female  : 1975    MRN: [id number]  DATE: 2019  TIME: 7:59 PM    Assessment and Plan   Right acute serous otitis media, recurrence not specified [H65 01]  1  Right acute serous otitis media, recurrence not specified  pseudoephedrine (SUDAFED) 30 mg tablet         Patient Instructions     Note that ear discomfort from fluid may persist for up to one month  Fluids and rest  Tylenol/Ibuprofen for discomfort   Over the counter decongestants as needed  Follow up with PCP in 3-5 days  Proceed to  ER if symptoms worsen  Chief Complaint     Chief Complaint   Patient presents with    Cold Like Symptoms     Pt c/o right ear pain, sore throat, feeling of "bubbling in ear," and post nasal drip x 6 days  History of Present Illness       Earache    There is pain in the right ear  This is a new problem  The current episode started in the past 7 days  The problem occurs constantly  The problem has been unchanged  There has been no fever  The pain is moderate  Pertinent negatives include no abdominal pain, coughing, diarrhea, ear discharge, headaches, rash, rhinorrhea, sore throat or vomiting  She has tried nothing (Claritin) for the symptoms  The treatment provided mild relief  Review of Systems   Review of Systems   Constitutional: Negative for activity change, appetite change, chills and fever  HENT: Positive for ear pain and postnasal drip  Negative for congestion, dental problem, ear discharge, facial swelling, rhinorrhea, sinus pressure, sinus pain, sneezing, sore throat and trouble swallowing  Eyes: Negative for itching  Respiratory: Negative for cough and shortness of breath  Cardiovascular: Negative for chest pain and palpitations  Gastrointestinal: Negative for abdominal pain, constipation, diarrhea, nausea and vomiting  Musculoskeletal: Negative for myalgias  Skin: Negative for rash     Neurological: Negative for dizziness, weakness, light-headedness and headaches           Current Medications       Current Outpatient Medications:     cetirizine (ZyrTEC) 10 mg tablet, Take 10 mg by mouth daily, Disp: , Rfl:     fluticasone (FLONASE) 50 mcg/act nasal spray, 1 spray into each nostril 2 (two) times a day, Disp: 16 g, Rfl: 0    gabapentin (NEURONTIN) 100 mg capsule, Take 1 capsule (100 mg total) by mouth 3 (three) times a day for 90 days, Disp: 90 capsule, Rfl: 2    hypromellose (ARTIFICIAL TEARS) 0 4 % SOLN, Administer 1 drop to both eyes 4 (four) times a day as needed for dry eyes for up to 90 days, Disp: 1 Bottle, Rfl: 5    naproxen (NAPROSYN) 500 mg tablet, Take 1 tablet (500 mg total) by mouth 2 (two) times a day with meals for 30 days, Disp: 60 tablet, Rfl: 0    omeprazole (PriLOSEC) 20 mg delayed release capsule, Take 1 capsule by mouth daily, Disp: , Rfl:     valACYclovir (VALTREX) 1,000 mg tablet, Take 2 tablets (2,000 mg total) by mouth as needed (fever blister) for up to 90 days, Disp: 15 tablet, Rfl: 3    methocarbamol (ROBAXIN) 500 mg tablet, Take 1 tablet (500 mg total) by mouth daily at bedtime as needed for muscle spasms for up to 10 days, Disp: 10 tablet, Rfl: 0    pseudoephedrine (SUDAFED) 30 mg tablet, Take 1 tablet (30 mg total) by mouth every 4 (four) hours as needed for congestion, Disp: 30 tablet, Rfl: 0    Current Allergies     Allergies as of 02/27/2019 - Reviewed 02/27/2019   Allergen Reaction Noted    Prednisone Facial Swelling 06/07/2018            The following portions of the patient's history were reviewed and updated as appropriate: allergies, current medications, past family history, past medical history, past social history, past surgical history and problem list      Past Medical History:   Diagnosis Date    Asthma        Past Surgical History:   Procedure Laterality Date    KNEE SURGERY      TUBAL LIGATION         Family History   Problem Relation Age of Onset    Diabetes Father     Breast cancer Maternal Grandmother     Asthma Family         CHRONIC    Thyroid disease Mother     Diabetes Paternal Grandmother         Lung CA    Heart disease Neg Hx          Medications have been verified  Objective   /82   Pulse 67   Temp 98 1 °F (36 7 °C) (Tympanic)   Resp 16   Ht 5' 6" (1 676 m)   Wt 82 6 kg (182 lb)   LMP 02/02/2019   SpO2 100%   BMI 29 38 kg/m²        Physical Exam     Physical Exam   Constitutional: She appears well-developed and well-nourished  No distress  HENT:   Head: Normocephalic and atraumatic  Right Ear: External ear normal    Left Ear: External ear normal    Nose: Nose normal    Mouth/Throat: Oropharynx is clear and moist  No oropharyngeal exudate  R Tm with fluid  L TM WNL  Eyes: Right eye exhibits no discharge  Left eye exhibits no discharge  Cardiovascular: Normal rate, regular rhythm and normal heart sounds  Exam reveals no gallop and no friction rub  No murmur heard  Pulmonary/Chest: Effort normal and breath sounds normal  No respiratory distress  She has no wheezes  She has no rales  She exhibits no tenderness  Lymphadenopathy:     She has no cervical adenopathy  Neurological: She is alert  Skin: Skin is warm  No rash noted  She is not diaphoretic  Psychiatric: She has a normal mood and affect  Her behavior is normal  Judgment and thought content normal    Vitals reviewed

## 2019-03-25 NOTE — PROGRESS NOTES
Assessment/Plan:    Normal findings on routine gyn exam with reported hx of bilateral breast pain  Advised monthly SBE, annual CBE and script given for B/L diagnostic mammogram and ultrasound if needed  Patient advised to decrease caffeine intake and on the use of primrose oil  Reviewed ASCCP guidelines and recommended pap with cotesting q3 yrs for this low risk patient - no pap done today, next due   Advised on diet and fiber for constipation  Will continue to monitor hair thinning and will call if worsens  Will need referal to derm at that point  RTO in one year or sooner PRN  Diagnoses and all orders for this visit:    Encounter for gynecological examination (general) (routine) with abnormal findings    Breast pain  -     Mammo diagnostic bilateral w 3d & cad; Future      Subjective:      Patient ID: Sophie Watts is a 41 y o  female  This patient presents for routine annual gyn exam     Menses are regular and light to mod occurring about every 30 days  She denies pelvic pain, dyspareunia, abnormal discharge, bowel/bladder dysfunction except for occasional constipation  Patient also reports intermittent B/L  breast pain, under breasts since last fall  The pain is "burning" in nature and occurs in either breast about 2x per month and lasts for about an hour  Has used Motrin for the pain without resolution  Patient states the pain just goes away on its own  Does state she drinks "a lot" of coffee and can have up to 48 oz per day on occasion  No breast pain today  Last mammography 18 was normal   No hx of abnormal mammo  MGM diagnosed with breast cancer in her 52's and  of the disease  Patient also concerned about thinning hair and concerned it may be hormonal   Last TSH normal 18  Pap/HPV testing up to date and normal - done 18, not due until   No hx of abnormal paps  Patient is not  but in a monogamous relationship for 9 years, engaged for 4 years    Tubal occlusion for contraception  Patient is a  at [employer] and enjoys playing handball  The following portions of the patient's history were reviewed and updated as appropriate: allergies, current medications, past family history, past medical history, past social history, past surgical history and problem list     Review of Systems   Constitutional: Negative  Bilateral breast pain as noted in HPI   HENT: Negative  Respiratory: Negative  Cardiovascular: Negative  Gastrointestinal: Positive for constipation  Endocrine: Negative  Genitourinary: Negative for difficulty urinating, dyspareunia, dysuria, frequency, menstrual problem, pelvic pain, urgency, vaginal bleeding, vaginal discharge and vaginal pain  Musculoskeletal: Negative  Skin:        Concerned with thinning hair   Neurological: Negative  Psychiatric/Behavioral: Negative  Objective:      /88 (BP Location: Right arm)   Ht 5' 6" (1 676 m)   Wt 82 1 kg (181 lb)   LMP 03/01/2019   BMI 29 21 kg/m²          Physical Exam   Constitutional: She is oriented to person, place, and time  She appears well-developed and well-nourished  She is cooperative  HENT:   Head: Normocephalic and atraumatic  Neck: Normal range of motion  Neck supple  No thyroid mass and no thyromegaly present  Cardiovascular: Normal rate, regular rhythm and normal heart sounds  Pulmonary/Chest: Effort normal and breath sounds normal  Right breast exhibits no inverted nipple, no mass, no nipple discharge, no skin change and no tenderness  Left breast exhibits no inverted nipple, no mass, no nipple discharge, no skin change and no tenderness  No breast tenderness or discharge  Breasts are symmetrical    Abdominal: Soft  Normal appearance and bowel sounds are normal  There is no hepatosplenomegaly  There is no tenderness  Genitourinary: Vagina normal and uterus normal  No breast tenderness or discharge   Pelvic exam was performed with patient supine  There is no rash, tenderness or lesion on the right labia  There is no rash, tenderness or lesion on the left labia  Uterus is not enlarged and not tender  Cervix exhibits no motion tenderness, no discharge and no friability  Right adnexum displays no mass and no tenderness  Left adnexum displays no mass and no tenderness  No bleeding in the vagina  No vaginal discharge found  Musculoskeletal: Normal range of motion  Lymphadenopathy:     She has no axillary adenopathy  No inguinal adenopathy noted on the right or left side  Right: No inguinal adenopathy present  Left: No inguinal adenopathy present  Neurological: She is alert and oriented to person, place, and time  Skin: Skin is warm, dry and intact  Psychiatric: She has a normal mood and affect  Her speech is normal and behavior is normal  Cognition and memory are normal    Nursing note and vitals reviewed

## 2019-03-26 ENCOUNTER — ANNUAL EXAM (OUTPATIENT)
Dept: GYNECOLOGY | Facility: CLINIC | Age: 44
End: 2019-03-26
Payer: COMMERCIAL

## 2019-03-26 VITALS
WEIGHT: 181 LBS | HEIGHT: 66 IN | BODY MASS INDEX: 29.09 KG/M2 | DIASTOLIC BLOOD PRESSURE: 88 MMHG | SYSTOLIC BLOOD PRESSURE: 138 MMHG

## 2019-03-26 DIAGNOSIS — Z01.411 ENCOUNTER FOR GYNECOLOGICAL EXAMINATION (GENERAL) (ROUTINE) WITH ABNORMAL FINDINGS: Primary | ICD-10-CM

## 2019-03-26 DIAGNOSIS — N64.4 BREAST PAIN: ICD-10-CM

## 2019-03-26 PROCEDURE — S0612 ANNUAL GYNECOLOGICAL EXAMINA: HCPCS | Performed by: OBSTETRICS & GYNECOLOGY

## 2019-03-26 NOTE — PATIENT INSTRUCTIONS
Normal findings on routine gyn exam with reported hx of bilateral breast pain  Advised monthly SBE, annual CBE and script given for B/L diagnostic mammogram and ultrasound if needed  Patient advised to decrease caffeine intake and on the use of primrose oil  Reviewed ASCCP guidelines and recommended pap with cotesting q3 yrs for this low risk patient - no pap done today, next due 2021  Advised on diet and fiber for constipation  Will continue to monitor hair thinning and will call if worsens  Will need referal to derm at that point  RTO in one year or sooner PRN

## 2019-04-04 ENCOUNTER — TELEPHONE (OUTPATIENT)
Dept: GYNECOLOGY | Facility: CLINIC | Age: 44
End: 2019-04-04

## 2019-04-10 DIAGNOSIS — J01.00 ACUTE NON-RECURRENT MAXILLARY SINUSITIS: ICD-10-CM

## 2019-04-11 RX ORDER — FLUTICASONE PROPIONATE 50 MCG
1 SPRAY, SUSPENSION (ML) NASAL 2 TIMES DAILY
Qty: 16 G | Refills: 0 | Status: SHIPPED | OUTPATIENT
Start: 2019-04-11

## 2019-04-11 RX ORDER — CETIRIZINE HYDROCHLORIDE 10 MG/1
10 TABLET ORAL DAILY
Qty: 30 TABLET | Refills: 2 | Status: SHIPPED | OUTPATIENT
Start: 2019-04-11

## 2019-04-19 ENCOUNTER — HOSPITAL ENCOUNTER (OUTPATIENT)
Dept: MAMMOGRAPHY | Facility: CLINIC | Age: 44
Discharge: HOME | End: 2019-04-19
Payer: COMMERCIAL

## 2019-04-19 ENCOUNTER — HOSPITAL ENCOUNTER (OUTPATIENT)
Dept: ULTRASOUND IMAGING | Facility: CLINIC | Age: 44
Discharge: HOME | End: 2019-04-19
Payer: COMMERCIAL

## 2019-04-19 VITALS — HEIGHT: 66 IN | WEIGHT: 181 LBS | BODY MASS INDEX: 29.09 KG/M2

## 2019-04-19 DIAGNOSIS — N64.4 BREAST PAIN: ICD-10-CM

## 2019-04-19 PROCEDURE — G0279 TOMOSYNTHESIS, MAMMO: HCPCS

## 2019-04-19 PROCEDURE — 76642 ULTRASOUND BREAST LIMITED: CPT

## 2019-04-19 PROCEDURE — 77066 DX MAMMO INCL CAD BI: CPT

## 2019-06-10 ENCOUNTER — OFFICE VISIT (OUTPATIENT)
Dept: URGENT CARE | Age: 44
End: 2019-06-10
Payer: COMMERCIAL

## 2019-06-10 VITALS
HEART RATE: 74 BPM | TEMPERATURE: 98.4 F | WEIGHT: 188 LBS | HEIGHT: 66 IN | RESPIRATION RATE: 18 BRPM | OXYGEN SATURATION: 100 % | DIASTOLIC BLOOD PRESSURE: 70 MMHG | SYSTOLIC BLOOD PRESSURE: 110 MMHG | BODY MASS INDEX: 30.22 KG/M2

## 2019-06-10 DIAGNOSIS — M25.551 RIGHT HIP PAIN: ICD-10-CM

## 2019-06-10 DIAGNOSIS — M54.31 RIGHT SIDED SCIATICA: Primary | ICD-10-CM

## 2019-06-10 PROCEDURE — 99213 OFFICE O/P EST LOW 20 MIN: CPT | Performed by: NURSE PRACTITIONER

## 2019-06-10 RX ORDER — KETOROLAC TROMETHAMINE 30 MG/ML
30 INJECTION, SOLUTION INTRAMUSCULAR; INTRAVENOUS ONCE
Status: COMPLETED | OUTPATIENT
Start: 2019-06-10 | End: 2019-06-10

## 2019-06-10 RX ORDER — METHOCARBAMOL 500 MG/1
500 TABLET, FILM COATED ORAL 2 TIMES DAILY
Qty: 20 TABLET | Refills: 0 | Status: SHIPPED | OUTPATIENT
Start: 2019-06-10 | End: 2019-08-08

## 2019-06-10 RX ORDER — POLYVINYL ALCOHOL 14 MG/ML
SOLUTION/ DROPS OPHTHALMIC
Refills: 5 | COMMUNITY
Start: 2019-04-24

## 2019-06-10 RX ADMIN — KETOROLAC TROMETHAMINE 30 MG: 30 INJECTION, SOLUTION INTRAMUSCULAR; INTRAVENOUS at 17:41

## 2019-06-13 ENCOUNTER — EVALUATION (OUTPATIENT)
Dept: PHYSICAL THERAPY | Facility: MEDICAL CENTER | Age: 44
End: 2019-06-13
Payer: COMMERCIAL

## 2019-06-13 DIAGNOSIS — M25.551 RIGHT HIP PAIN: ICD-10-CM

## 2019-06-13 DIAGNOSIS — M54.31 RIGHT SIDED SCIATICA: Primary | ICD-10-CM

## 2019-06-13 PROCEDURE — 97162 PT EVAL MOD COMPLEX 30 MIN: CPT | Performed by: PHYSICAL THERAPIST

## 2019-06-19 ENCOUNTER — OFFICE VISIT (OUTPATIENT)
Dept: PHYSICAL THERAPY | Facility: MEDICAL CENTER | Age: 44
End: 2019-06-19
Payer: COMMERCIAL

## 2019-06-19 DIAGNOSIS — M25.551 RIGHT HIP PAIN: ICD-10-CM

## 2019-06-19 DIAGNOSIS — M54.31 RIGHT SIDED SCIATICA: Primary | ICD-10-CM

## 2019-06-19 PROCEDURE — 97140 MANUAL THERAPY 1/> REGIONS: CPT | Performed by: PHYSICAL THERAPIST

## 2019-06-19 PROCEDURE — 97110 THERAPEUTIC EXERCISES: CPT | Performed by: PHYSICAL THERAPIST

## 2019-06-25 ENCOUNTER — OFFICE VISIT (OUTPATIENT)
Dept: PHYSICAL THERAPY | Facility: MEDICAL CENTER | Age: 44
End: 2019-06-25
Payer: COMMERCIAL

## 2019-06-25 DIAGNOSIS — M25.551 RIGHT HIP PAIN: ICD-10-CM

## 2019-06-25 DIAGNOSIS — M54.31 RIGHT SIDED SCIATICA: Primary | ICD-10-CM

## 2019-06-25 PROCEDURE — 97140 MANUAL THERAPY 1/> REGIONS: CPT | Performed by: PHYSICAL THERAPIST

## 2019-06-25 PROCEDURE — 97110 THERAPEUTIC EXERCISES: CPT | Performed by: PHYSICAL THERAPIST

## 2019-06-26 ENCOUNTER — OFFICE VISIT (OUTPATIENT)
Dept: PHYSICAL THERAPY | Facility: MEDICAL CENTER | Age: 44
End: 2019-06-26
Payer: COMMERCIAL

## 2019-06-26 DIAGNOSIS — M54.31 RIGHT SIDED SCIATICA: Primary | ICD-10-CM

## 2019-06-26 DIAGNOSIS — M25.551 RIGHT HIP PAIN: ICD-10-CM

## 2019-06-26 PROCEDURE — 97140 MANUAL THERAPY 1/> REGIONS: CPT | Performed by: PHYSICAL THERAPIST

## 2019-06-26 PROCEDURE — 97112 NEUROMUSCULAR REEDUCATION: CPT | Performed by: PHYSICAL THERAPIST

## 2019-07-02 ENCOUNTER — APPOINTMENT (OUTPATIENT)
Dept: PHYSICAL THERAPY | Facility: MEDICAL CENTER | Age: 44
End: 2019-07-02
Payer: COMMERCIAL

## 2019-07-05 ENCOUNTER — APPOINTMENT (OUTPATIENT)
Dept: PHYSICAL THERAPY | Facility: MEDICAL CENTER | Age: 44
End: 2019-07-05
Payer: COMMERCIAL

## 2019-07-09 ENCOUNTER — APPOINTMENT (OUTPATIENT)
Dept: PHYSICAL THERAPY | Facility: MEDICAL CENTER | Age: 44
End: 2019-07-09
Payer: COMMERCIAL

## 2019-07-10 ENCOUNTER — OFFICE VISIT (OUTPATIENT)
Dept: PHYSICAL THERAPY | Facility: MEDICAL CENTER | Age: 44
End: 2019-07-10
Payer: COMMERCIAL

## 2019-07-10 DIAGNOSIS — M54.31 RIGHT SIDED SCIATICA: Primary | ICD-10-CM

## 2019-07-10 DIAGNOSIS — M25.551 RIGHT HIP PAIN: ICD-10-CM

## 2019-07-10 PROCEDURE — 97140 MANUAL THERAPY 1/> REGIONS: CPT | Performed by: PHYSICAL THERAPIST

## 2019-07-10 PROCEDURE — 97112 NEUROMUSCULAR REEDUCATION: CPT | Performed by: PHYSICAL THERAPIST

## 2019-07-10 NOTE — PROGRESS NOTES
Daily Note     Today's date: 7/10/2019  Patient name: Barrie Larry  : 1975  MRN: [id number]  Referring provider: GERBER Flowers  Dx:   Encounter Diagnosis     ICD-10-CM    1  Right sided sciatica M54 31    2  Right hip pain M25 551                   Subjective: Patient states over the past weekend she was able to participate in a few casual games of hand ball  She states she was able to play with mild difficulty, mostly with pushing off her right leg, and did fatigue towards the end of playing  She reports pain more in the proximal ITB region  Patient has her tournament in two weeks but feels she       Objective: See treatment diary below      Assessment: IASTM to right proximal ITB today as this is where patients symptoms presented  Patient instructed in independent stretches to address hamstrings, rectus femoris, and ITB  Progressed hip stabilization exercises with CKC and dynamic movements to mimic sport specific moves  Patient requires moderate cueing for dynamic sequencing and valgus control  Patient fatigue post treatment session but denied any increase in symptoms  Plan: Continue per plan of care       Precautions: asthma, bilateral knee surgeries (arthroscopic)      Manual   7/10        Long axis distraction CK            Inferior hip glide  Gr  III-IV           STM right thigh and piriformis  x8'           Rectus femoris contract relax  x5' x5' x5' np        Total time =   15' 5' 5'             Exercise Diary   7/10        glute isometrics x10 np           Standing SLR  2x10 2x10 np         SLR: flexion  2x10 3x10 3x10 np        Supine clam nv BTB 3x10 BTB 3x10 purple 3x10 purple 3x10        Bridge with add lópez nv 3x10 3x10 3x10 np        Eccentric hip flexion EOT  nv 2x10 2x10 np        treadmill  x5' np          Cobra stretch  10" x10 10" x10 10" x10 np        Squats with dynamic valgus control    4x5 nv        Lateral band walks    Green [employer] 20ft x4        Step ups w/ valgus control    L2 x10 L2 2x10        Ski jumps     2x10        Foam roller instructed in reviewed   HEP        Quad/hamstring/ITB stretches     20"x4 ea        SL clam shell     2x10        SL abduction     2x10                                                                             Modalities              MHP in supine to right hip/thigh x10' x10' post tx  x10' post tx  x10' post tx  x10' post tx          Laser- right ITB  nv CK CK CK

## 2019-07-12 ENCOUNTER — APPOINTMENT (OUTPATIENT)
Dept: PHYSICAL THERAPY | Facility: MEDICAL CENTER | Age: 44
End: 2019-07-12
Payer: COMMERCIAL

## 2019-07-17 ENCOUNTER — OFFICE VISIT (OUTPATIENT)
Dept: PHYSICAL THERAPY | Facility: MEDICAL CENTER | Age: 44
End: 2019-07-17
Payer: COMMERCIAL

## 2019-07-17 DIAGNOSIS — M54.31 RIGHT SIDED SCIATICA: Primary | ICD-10-CM

## 2019-07-17 DIAGNOSIS — M25.551 RIGHT HIP PAIN: ICD-10-CM

## 2019-07-17 PROCEDURE — 97110 THERAPEUTIC EXERCISES: CPT | Performed by: PHYSICAL THERAPIST

## 2019-07-17 PROCEDURE — 97140 MANUAL THERAPY 1/> REGIONS: CPT | Performed by: PHYSICAL THERAPIST

## 2019-07-17 PROCEDURE — 97112 NEUROMUSCULAR REEDUCATION: CPT | Performed by: PHYSICAL THERAPIST

## 2019-07-17 NOTE — PROGRESS NOTES
Daily Note     Today's date: 2019  Patient name: Charlotte Srivastava  : 1975  MRN: [id number]  Referring provider: GERBER Bernstein  Dx:   Encounter Diagnosis     ICD-10-CM    1  Right sided sciatica M54 31    2  Right hip pain M25 551                   Subjective: Patient reports decreased anterior hip pain but has pain along the proximal to mid ITB  She states she is able to sit delia cross on the floor now and is able to play causal games of hand ball  Objective: See treatment diary below    Outcome measures: FOTO = 69 (improved from score of 46 at intake)      Assessment: STM with the stick utilized today along proximal and distal aspects of ITB with positive response  Continued with hip girdle strengthening, with specific focus on glute med  Patient fatigued post treatment session  Patient advised to continue with stretching and foam rolling  Patient will be away on vacation and return   Monitor response at that time  Plan: Continue per plan of care       Precautions: asthma, bilateral knee surgeries (arthroscopic)      Manual  6/13 6/19 6/25 6/26 7/10 7/17       Long axis distraction CK            Inferior hip glide  Gr  III-IV           STM right ITB  x8'    The stick  x8'       Rectus femoris contract relax  x5' x5' x5' np        Total time =   15' 5' 5'  x8'           Exercise Diary  6/13 6/19 6/25 6/26 7/10 7/17       glute isometrics x10 np           Standing SLR  2x10 2x10 np         SLR: flexion  2x10 3x10 3x10 np        Supine clam nv BTB 3x10 BTB 3x10 purple 3x10 purple 3x10        Bridge with add lópez nv 3x10 3x10 3x10 np        Eccentric hip flexion EOT  nv 2x10 2x10 np        treadmill  x5' np          Cobra stretch  10" x10 10" x10 10" x10 np        Squats with dynamic valgus control    4x5 nv        Lateral band walks    Green 10x2 Green 20ft x4 Green 20ft x4       Step ups w/ valgus control    L2 x10 L2 2x10 L2 2x10       Ski jumps     2x10 2x10       Foam roller instructed in reviewed   HEP        Quad/hamstring/ITB stretches     20"x4 ea 20"x4 ea       SL clam shell     2x10 3x10       SL abduction     2x10 3x10                                                                            Modalities              MHP in supine to right hip/thigh x10' x10' post tx  x10' post tx  x10' post tx  x10' post tx  x10' post tx          Laser- right ITB  nv CK CK CK np

## 2019-07-18 ENCOUNTER — APPOINTMENT (OUTPATIENT)
Dept: PHYSICAL THERAPY | Facility: MEDICAL CENTER | Age: 44
End: 2019-07-18
Payer: COMMERCIAL

## 2019-08-06 DIAGNOSIS — K21.9 GASTROESOPHAGEAL REFLUX DISEASE, ESOPHAGITIS PRESENCE NOT SPECIFIED: Primary | ICD-10-CM

## 2019-08-07 RX ORDER — OMEPRAZOLE 20 MG/1
CAPSULE, DELAYED RELEASE ORAL
Qty: 90 CAPSULE | Refills: 3 | Status: SHIPPED | OUTPATIENT
Start: 2019-08-07 | End: 2019-08-08 | Stop reason: SDUPTHER

## 2019-08-08 ENCOUNTER — OFFICE VISIT (OUTPATIENT)
Dept: FAMILY MEDICINE CLINIC | Facility: CLINIC | Age: 44
End: 2019-08-08
Payer: COMMERCIAL

## 2019-08-08 VITALS
HEIGHT: 66 IN | WEIGHT: 183 LBS | SYSTOLIC BLOOD PRESSURE: 118 MMHG | HEART RATE: 63 BPM | BODY MASS INDEX: 29.41 KG/M2 | DIASTOLIC BLOOD PRESSURE: 72 MMHG

## 2019-08-08 DIAGNOSIS — Z00.00 WELL ADULT EXAM: ICD-10-CM

## 2019-08-08 DIAGNOSIS — J30.1 ALLERGIC RHINITIS DUE TO POLLEN, UNSPECIFIED SEASONALITY: ICD-10-CM

## 2019-08-08 DIAGNOSIS — J45.40 MODERATE PERSISTENT ASTHMA WITHOUT COMPLICATION: Primary | ICD-10-CM

## 2019-08-08 DIAGNOSIS — Z00.00 ANNUAL PHYSICAL EXAM: ICD-10-CM

## 2019-08-08 DIAGNOSIS — K21.9 GASTROESOPHAGEAL REFLUX DISEASE, ESOPHAGITIS PRESENCE NOT SPECIFIED: ICD-10-CM

## 2019-08-08 DIAGNOSIS — H65.01 RIGHT ACUTE SEROUS OTITIS MEDIA, RECURRENCE NOT SPECIFIED: ICD-10-CM

## 2019-08-08 PROCEDURE — 99396 PREV VISIT EST AGE 40-64: CPT | Performed by: FAMILY MEDICINE

## 2019-08-08 RX ORDER — ALBUTEROL SULFATE 90 UG/1
2 AEROSOL, METERED RESPIRATORY (INHALATION) EVERY 6 HOURS PRN
Qty: 1 INHALER | Refills: 0 | Status: SHIPPED | OUTPATIENT
Start: 2019-08-08

## 2019-08-08 RX ORDER — OMEPRAZOLE 20 MG/1
20 CAPSULE, DELAYED RELEASE ORAL DAILY
Qty: 90 CAPSULE | Refills: 3 | Status: SHIPPED | OUTPATIENT
Start: 2019-08-08

## 2019-08-08 RX ORDER — PSEUDOEPHEDRINE HYDROCHLORIDE 30 MG/1
30 TABLET ORAL EVERY 4 HOURS PRN
Qty: 30 TABLET | Refills: 1
Start: 2019-08-08

## 2019-08-08 NOTE — PATIENT INSTRUCTIONS
Wellness Visit for Adults   AMBULATORY CARE:   A wellness visit  is when you see your healthcare provider to get screened for health problems  You can also get advice on how to stay healthy  Write down your questions so you remember to ask them  Ask your healthcare provider how often you should have a wellness visit  What happens at a wellness visit:  Your healthcare provider will ask about your health, and your family history of health problems  This includes high blood pressure, heart disease, and cancer  He or she will ask if you have symptoms that concern you, if you smoke, and about your mood  You may also be asked about your intake of medicines, supplements, food, and alcohol  Any of the following may be done:  · Your weight  will be checked  Your height may also be checked so your body mass index (BMI) can be calculated  Your BMI shows if you are at a healthy weight  · Your blood pressure  and heart rate will be checked  Your temperature may also be checked  · Blood and urine tests  may be done  Blood tests may be done to check your cholesterol levels  Abnormal cholesterol levels increase your risk for heart disease and stroke  You may also need a blood or urine test to check for diabetes if you are at increased risk  Urine tests may be done to look for signs of an infection or kidney disease  · A physical exam  includes checking your heartbeat and lungs with a stethoscope  Your healthcare provider may also check your skin to look for sun damage  · Screening tests  may be recommended  A screening test is done to check for diseases that may not cause symptoms  The screening tests you may need depend on your age, gender, family history, and lifestyle habits  For example, colorectal screening may be recommended if you are 48years old or older  Screening tests you need if you are a woman:   · A Pap smear  is used to screen for cervical cancer   Pap smears are usually done every 3 to 5 years depending on your age  You may need them more often if you have had abnormal Pap smear test results in the past  Ask your healthcare provider how often you should have a Pap smear  · A mammogram  is an x-ray of your breasts to screen for breast cancer  Experts recommend mammograms every 2 years starting at age 48 years  You may need a mammogram at age 52 years or younger if you have an increased risk for breast cancer  Talk to your healthcare provider about when you should start having mammograms and how often you need them  Vaccines you may need:   · Get an influenza vaccine  every year  The influenza vaccine protects you from the flu  Several types of viruses cause the flu  The viruses change over time, so new vaccines are made each year  · Get a tetanus-diphtheria (Td) booster vaccine  every 10 years  This vaccine protects you against tetanus and diphtheria  Tetanus is a severe infection that may cause painful muscle spasms and lockjaw  Diphtheria is a severe bacterial infection that causes a thick covering in the back of your mouth and throat  · Get a human papillomavirus (HPV) vaccine  if you are female and aged 23 to 32 or male 23 to 24 and never received it  This vaccine protects you from HPV infection  HPV is the most common infection spread by sexual contact  HPV may also cause vaginal, penile, and anal cancers  · Get a pneumococcal vaccine  if you are aged 72 years or older  The pneumococcal vaccine is an injection given to protect you from pneumococcal disease  Pneumococcal disease is an infection caused by pneumococcal bacteria  The infection may cause pneumonia, meningitis, or an ear infection  · Get a shingles vaccine  if you are aged 61 or older, even if you have had shingles before  The shingles vaccine is an injection to protect you from the varicella-zoster virus  This is the same virus that causes chickenpox   Shingles is a painful rash that develops in people who had chickenpox or have been exposed to the virus  How to eat healthy:  My Plate is a model for planning healthy meals  It shows the types and amounts of foods that should go on your plate  Fruits and vegetables make up about half of your plate, and grains and protein make up the other half  A serving of dairy is included on the side of your plate  The amount of calories and serving sizes you need depends on your age, gender, weight, and height  Examples of healthy foods are listed below:  · Eat a variety of vegetables  such as dark green, red, and orange vegetables  You can also include canned vegetables low in sodium (salt) and frozen vegetables without added butter or sauces  · Eat a variety of fresh fruits , canned fruit in 100% juice, frozen fruit, and dried fruit  · Include whole grains  At least half of the grains you eat should be whole grains  Examples include whole-wheat bread, wheat pasta, brown rice, and whole-grain cereals such as oatmeal     · Eat a variety of protein foods such as seafood (fish and shellfish), lean meat, and poultry without skin (turkey and chicken)  Examples of lean meats include pork leg, shoulder, or tenderloin, and beef round, sirloin, tenderloin, and extra lean ground beef  Other protein foods include eggs and egg substitutes, beans, peas, soy products, nuts, and seeds  · Choose low-fat dairy products such as skim or 1% milk or low-fat yogurt, cheese, and cottage cheese  · Limit unhealthy fats  such as butter, hard margarine, and shortening  Exercise:  Exercise at least 30 minutes per day on most days of the week  Some examples of exercise include walking, biking, dancing, and swimming  You can also fit in more physical activity by taking the stairs instead of the elevator or parking farther away from stores  Include muscle strengthening activities 2 days each week  Regular exercise provides many health benefits   It helps you manage your weight, and decreases your risk for type 2 diabetes, heart disease, stroke, and high blood pressure  Exercise can also help improve your mood  Ask your healthcare provider about the best exercise plan for you  General health and safety guidelines:   · Do not smoke  Nicotine and other chemicals in cigarettes and cigars can cause lung damage  Ask your healthcare provider for information if you currently smoke and need help to quit  E-cigarettes or smokeless tobacco still contain nicotine  Talk to your healthcare provider before you use these products  · Limit alcohol  A drink of alcohol is 12 ounces of beer, 5 ounces of wine, or 1½ ounces of liquor  · Lose weight, if needed  Being overweight increases your risk of certain health conditions  These include heart disease, high blood pressure, type 2 diabetes, and certain types of cancer  · Protect your skin  Do not sunbathe or use tanning beds  Use sunscreen with a SPF 15 or higher  Apply sunscreen at least 15 minutes before you go outside  Reapply sunscreen every 2 hours  Wear protective clothing, hats, and sunglasses when you are outside  · Drive safely  Always wear your seatbelt  Make sure everyone in your car wears a seatbelt  A seatbelt can save your life if you are in an accident  Do not use your cell phone when you are driving  This could distract you and cause an accident  Pull over if you need to make a call or send a text message  · Practice safe sex  Use latex condoms if are sexually active and have more than one partner  Your healthcare provider may recommend screening tests for sexually transmitted infections (STIs)  · Wear helmets, lifejackets, and protective gear  Always wear a helmet when you ride a bike or motorcycle, go skiing, or play sports that could cause a head injury  Wear protective equipment when you play sports  Wear a lifejacket when you are on a boat or doing water sports    © 2017 [employer] Information is for End User's use only and may not be sold, redistributed or otherwise used for commercial purposes  All illustrations and images included in CareNotes® are the copyrighted property of [employer]  or [employer]  The above information is an  only  It is not intended as medical advice for individual conditions or treatments  Talk to your doctor, nurse or pharmacist before following any medical regimen to see if it is safe and effective for you  Weight Management   AMBULATORY CARE:   Why it is important to manage your weight:  Being overweight increases your risk of health conditions such as heart disease, high blood pressure, type 2 diabetes, and certain types of cancer  It can also increase your risk for osteoarthritis, sleep apnea, and other respiratory problems  Aim for a slow, steady weight loss  Even a small amount of weight loss can lower your risk of health problems  How to lose weight safely:  A safe and healthy way to lose weight is to eat fewer calories and get regular exercise  You can lose up about 1 pound a week by decreasing the number of calories you eat by 500 calories each day  You can decrease calories by eating smaller portion sizes or by cutting out high-calorie foods  Read labels to find out how many calories are in the foods you eat  You can also burn calories with exercise such as walking, swimming, or biking  You will be more likely to keep weight off if you make these changes part of your lifestyle  Healthy meal plan for weight management:  A healthy meal plan includes a variety of foods, contains fewer calories, and helps you stay healthy  A healthy meal plan includes the following:  · Eat whole-grain foods more often  A healthy meal plan should contain fiber  Fiber is the part of grains, fruits, and vegetables that is not broken down by your body  Whole-grain foods are healthy and provide extra fiber in your diet   Some examples of whole-grain foods are whole-wheat breads and pastas, oatmeal, brown rice, and bulgur  · Eat a variety of vegetables every day  Include dark, leafy greens such as spinach, kale, moe greens, and mustard greens  Eat yellow and orange vegetables such as carrots, sweet potatoes, and winter squash  · Eat a variety of fruits every day  Choose fresh or canned fruit (canned in its own juice or light syrup) instead of juice  Fruit juice has very little or no fiber  · Eat low-fat dairy foods  Drink fat-free (skim) milk or 1% milk  Eat fat-free yogurt and low-fat cottage cheese  Try low-fat cheeses such as mozzarella and other reduced-fat cheeses  · Choose meat and other protein foods that are low in fat  Choose beans or other legumes such as split peas or lentils  Choose fish, skinless poultry (chicken or turkey), or lean cuts of red meat (beef or pork)  Before you cook meat or poultry, cut off any visible fat  · Use less fat and oil  Try baking foods instead of frying them  Add less fat, such as margarine, sour cream, regular salad dressing and mayonnaise to foods  Eat fewer high-fat foods  Some examples of high-fat foods include french fries, doughnuts, ice cream, and cakes  · Eat fewer sweets  Limit foods and drinks that are high in sugar  This includes candy, cookies, regular soda, and sweetened drinks  Ways to decrease calories:   · Eat smaller portions  ¨ Use a small plate with smaller servings  ¨ Do not eat second helpings  ¨ When you eat at a restaurant, ask for a box and place half of your meal in the box before you eat  ¨ Share an entrée with someone else  · Replace high-calorie snacks with healthy, low-calorie snacks  ¨ Choose fresh fruit, vegetables, fat-free rice cakes, or air-popped popcorn instead of potato chips, nuts, or chocolate  ¨ Choose water or calorie-free drinks instead of soda or sweetened drinks  · Eat regular meals  Skipping meals can lead to overeating later in the day   Eat a healthy snack in place of a meal if you do not have time to eat a regular meal      · Do not shop for groceries when you are hungry  You may be more likely to make unhealthy food choices  Take a grocery list of healthy foods and shop after you have eaten  Exercise:  Exercise at least 30 minutes per day on most days of the week  Some examples of exercise include walking, biking, dancing, and swimming  You can also fit in more physical activity by taking the stairs instead of the elevator or parking farther away from stores  Ask your healthcare provider about the best exercise plan for you  Other things to consider as you try to lose weight:   · Be aware of situations that may give you the urge to overeat, such as eating while watching television  Find ways to avoid these situations  For example, read a book, go for a walk, or do crafts  · Meet with a weight loss support group or friends who are also trying to lose weight  This may help you stay motivated to continue working on your weight loss goals  © 2017 [facility] Information is for End User's use only and may not be sold, redistributed or otherwise used for commercial purposes  All illustrations and images included in CareNotes® are the copyrighted property of [employer]  or [employer] Rosenberg  The above information is an  only  It is not intended as medical advice for individual conditions or treatments  Talk to your doctor, nurse or pharmacist before following any medical regimen to see if it is safe and effective for you  Low Fat Diet   AMBULATORY CARE:   A low-fat diet  is an eating plan that is low in total fat, unhealthy fat, and cholesterol  You may need to follow a low-fat diet if you have trouble digesting or absorbing fat  You may also need to follow this diet if you have high cholesterol  You can also lower your cholesterol by increasing the amount of fiber in your diet   Soluble fiber is a type of fiber that helps to decrease cholesterol levels  Different types of fat in food:   · Limit unhealthy fats  A diet that is high in cholesterol, saturated fat, and trans fat may cause unhealthy cholesterol levels  Unhealthy cholesterol levels increase your risk of heart disease  ¨ Cholesterol:  Limit intake of cholesterol to less than 200 mg per day  Cholesterol is found in meat, eggs, and dairy  ¨ Saturated fat:  Limit saturated fat to less than 7% of your total daily calories  Ask your dietitian how many calories you need each day  Saturated fat is found in butter, cheese, ice cream, whole milk, and palm oil  Saturated fat is also found in meat, such as beef, pork, chicken skin, and processed meats  Processed meats include sausage, hot dogs, and bologna  ¨ Trans fat:  Avoid trans fat as much as possible  Trans fat is used in fried and baked foods  Foods that say trans fat free on the label may still have up to 0 5 grams of trans fat per serving  · Include healthy fats  Replace foods that are high in saturated and trans fat with foods high in healthy fats  This may help to decrease high cholesterol levels  ¨ Monounsaturated fats: These are found in avocados, nuts, and vegetable oils, such as olive, canola, and sunflower oil  ¨ Polyunsaturated fats: These can be found in vegetable oils, such as soybean or corn oil  Omega-3 fats can help to decrease the risk of heart disease  Omega-3 fats are found in fish, such as salmon, herring, trout, and tuna  Omega-3 fats can also be found in plant foods, such as walnuts, flaxseed, soybeans, and canola oil    Foods to limit or avoid:   · Grains:      ¨ Snacks that are made with partially hydrogenated oils, such as chips, regular crackers, and butter-flavored popcorn    ¨ High-fat baked goods, such as biscuits, croissants, doughnuts, pies, cookies, and pastries    · Dairy:      ¨ Whole milk, 2% milk, and yogurt and ice cream made with whole milk    ¨ Half and half creamer, heavy cream, and whipping cream    ¨ Cheese, cream cheese, and sour cream    · Meats and proteins:      ¨ High-fat cuts of meat (T-bone steak, regular hamburger, and ribs)    ¨ Fried meat, poultry (turkey and chicken), and fish    ¨ Poultry (chicken and turkey) with skin    ¨ Cold cuts (salami or bologna), hot dogs, duncan, and sausage    ¨ Whole eggs and egg yolks    · Vegetables and fruits with added fat:      ¨ Fried vegetables or vegetables in butter or high-fat sauces, such as cream or cheese sauces    ¨ Fried fruit or fruit served with butter or cream    · Fats:      ¨ Butter, stick margarine, and shortening    ¨ Coconut, palm oil, and palm kernel oil  Foods to include:   · Grains:      ¨ Whole-grain breads, cereals, pasta, and brown rice    ¨ Low-fat crackers and pretzels    · Vegetables and fruits:      ¨ Fresh, frozen, or canned vegetables (no salt or low-sodium)    ¨ Fresh, frozen, dried, or canned fruit (canned in light syrup or fruit juice)    ¨ Avocado    · Low-fat dairy products:      ¨ Nonfat (skim) or 1% milk    ¨ Nonfat or low-fat cheese, yogurt, and cottage cheese    · Meats and proteins:      ¨ Chicken or turkey with no skin    ¨ Baked or broiled fish    ¨ Lean beef and pork (loin, round, extra lean hamburger)    ¨ Beans and peas, unsalted nuts, soy products    ¨ Egg whites and substitutes    ¨ Seeds and nuts    · Fats:      ¨ Unsaturated oil, such as canola, olive, peanut, soybean, or sunflower oil    ¨ Soft or liquid margarine and vegetable oil spread    ¨ Low-fat salad dressing  Other ways to decrease fat:   · Read food labels before you buy foods  Choose foods that have less than 30% of calories from fat  Choose low-fat or fat-free dairy products  Remember that fat free does not mean calorie free  These foods still contain calories, and too many calories can lead to weight gain  · Trim fat from meat and avoid fried food  Trim all visible fat from meat before you cook it   Remove the skin from poultry  Do not lomeli meat, fish, or poultry  Bake, roast, boil, or broil these foods instead  Avoid fried foods  Eat a baked potato instead of [place] fries  Steam vegetables instead of sautéing them in butter  · Add less fat to foods  Use imitation duncan bits on salads and baked potatoes instead of regular duncan bits  Use fat-free or low-fat salad dressings instead of regular dressings  Use low-fat or nonfat butter-flavored topping instead of regular butter or margarine on popcorn and other foods  Ways to decrease fat in recipes:  Replace high-fat ingredients with low-fat or nonfat ones  This may cause baked goods to be drier than usual  You may need to use nonfat cooking spray on pans to prevent food from sticking  You also may need to change the amount of other ingredients, such as water, in the recipe  Try the following:  · Use low-fat or light margarine instead of regular margarine or shortening  · Use lean ground turkey breast or chicken, or lean ground beef (less than 5% fat) instead of hamburger  · Add 1 teaspoon of canola oil to 8 ounces of skim milk instead of using cream or half and half  · Use grated zucchini, carrots, or apples in breads instead of coconut  · Use blenderized, low-fat cottage cheese, plain tofu, or low-fat ricotta cheese instead of cream cheese  · Use 1 egg white and 1 teaspoon of canola oil, or use ¼ cup (2 ounces) of fat-free egg substitute instead of a whole egg  · Replace half of the oil that is called for in a recipe with applesauce when you bake  Use 3 tablespoons of cocoa powder and 1 tablespoon of canola oil instead of a square of baking chocolate  How to increase fiber:  Eat enough high-fiber foods to get 20 to 30 grams of fiber every day  Slowly increase your fiber intake to avoid stomach cramps, gas, and other problems  · Eat 3 ounces of whole-grain foods each day  An ounce is about 1 slice of bread   Eat whole-grain breads, such as whole-wheat bread  Whole wheat, whole-wheat flour, or other whole grains should be listed as the first ingredient on the food label  Replace white flour with whole-grain flour or use half of each in recipes  Whole-grain flour is heavier than white flour, so you may have to add more yeast or baking powder  · Eat a high-fiber cereal for breakfast   Oatmeal is a good source of soluble fiber  Look for cereals that have bran or fiber in the name  Choose whole-grain products, such as brown rice, barley, and whole-wheat pasta  · Eat more beans, peas, and lentils  For example, add beans to soups or salads  Eat at least 5 cups of fruits and vegetables each day  Eat fruits and vegetables with the peel because the peel is high in fiber  © 2017 [address] [employer] Information is for End User's use only and may not be sold, redistributed or otherwise used for commercial purposes  All illustrations and images included in CareNotes® are the copyrighted property of [employer]  or Jos[employer] Rosenberg  The above information is an  only  It is not intended as medical advice for individual conditions or treatments  Talk to your doctor, nurse or pharmacist before following any medical regimen to see if it is safe and effective for you  Heart Healthy Diet   AMBULATORY CARE:   A heart healthy diet  is an eating plan low in total fat, unhealthy fats, and sodium (salt)  A heart healthy diet helps decrease your risk for heart disease and stroke  Limit the amount of fat you eat to 25% to 35% of your total daily calories  Limit sodium to less than 2,300 mg each day  Healthy fats:  Healthy fats can help improve cholesterol levels  The risk for heart disease is decreased when cholesterol levels are normal  Choose healthy fats, such as the following:  · Unsaturated fat  is found in foods such as soybean, canola, olive, corn, and safflower oils   It is also found in soft tub margarine that is made with liquid vegetable oil  · Omega-3 fat  is found in certain fish, such as salmon, tuna, and trout, and in walnuts and flaxseed  Unhealthy fats:  Unhealthy fats can cause unhealthy cholesterol levels in your blood and increase your risk of heart disease  Limit unhealthy fats, such as the following:  · Cholesterol  is found in animal foods, such as eggs and lobster, and in dairy products made from whole milk  Limit cholesterol to less than 300 milligrams (mg) each day  You may need to limit cholesterol to 200 mg each day if you have heart disease  · Saturated fat  is found in meats, such as duncan and hamburger  It is also found in chicken or turkey skin, whole milk, and butter  Limit saturated fat to less than 7% of your total daily calories  Limit saturated fat to less than 6% if you have heart disease or are at increased risk for it  · Trans fat  is found in packaged foods, such as potato chips and cookies  It is also in hard margarine, some fried foods, and shortening  Avoid trans fats as much as possible    Heart healthy foods and drinks to include:  Ask your dietitian or healthcare provider how many servings to have from each of the following food groups:  · Grains:      ¨ Whole-wheat breads, cereals, and pastas, and brown rice    ¨ Low-fat, low-sodium crackers and chips    · Vegetables:      ¨ Broccoli, green beans, green peas, and spinach    ¨ Collards, kale, and lima beans    ¨ Carrots, sweet potatoes, tomatoes, and peppers    ¨ Canned vegetables with no salt added    · Fruits:      ¨ Bananas, peaches, pears, and pineapple    ¨ Grapes, raisins, and dates    ¨ Oranges, tangerines, grapefruit, orange juice, and grapefruit juice    ¨ Apricots, mangoes, melons, and papaya    ¨ Raspberries and strawberries    ¨ Canned fruit with no added sugar    · Low-fat dairy products:      ¨ Nonfat (skim) milk, 1% milk, and low-fat almond, cashew, or soy milks fortified with calcium    ¨ Low-fat cheese, regular or frozen yogurt, and cottage cheese    · Meats and proteins , such as lean cuts of beef and pork (loin, leg, round), skinless chicken and turkey, legumes, soy products, egg whites, and nuts  Foods and drinks to limit or avoid:  Ask your dietitian or healthcare provider about these and other foods that are high in unhealthy fat, sodium, and sugar:  · Snack or packaged foods , such as frozen dinners, cookies, macaroni and cheese, and cereals with more than 300 mg of sodium per serving    · Canned or dry mixes  for cakes, soups, sauces, or gravies    · Vegetables with added sodium , such as instant potatoes, vegetables with added sauces, or regular canned vegetables    · Other foods high in sodium , such as ketchup, barbecue sauce, salad dressing, pickles, olives, soy sauce, and miso    · High-fat dairy foods  such as whole or 2% milk, cream cheese, or sour cream, and cheeses     · High-fat protein foods  such as high-fat cuts of beef (T-bone steaks, ribs), chicken or turkey with skin, and organ meats, such as liver    · Cured or smoked meats , such as hot dogs, duncan, and sausage    · Unhealthy fats and oils , such as butter, stick margarine, shortening, and cooking oils such as coconut or palm oil    · Food and drinks high in sugar , such as soft drinks (soda), sports drinks, sweetened tea, candy, cake, cookies, pies, and doughnuts  Other diet guidelines to follow:   · Eat more foods containing omega-3 fats  Eat fish high in omega-3 fats at least 2 times a week  · Limit alcohol  Too much alcohol can damage your heart and raise your blood pressure  Women should limit alcohol to 1 drink a day  Men should limit alcohol to 2 drinks a day  A drink of alcohol is 12 ounces of beer, 5 ounces of wine, or 1½ ounces of liquor  · Choose low-sodium foods  High-sodium foods can lead to high blood pressure  Add little or no salt to food you prepare  Use herbs and spices in place of salt      · Eat more fiber  to help lower cholesterol levels  Eat at least 5 servings of fruits and vegetables each day  Eat 3 ounces of whole-grain foods each day  Legumes (beans) are also a good source of fiber  Lifestyle guidelines:   · Do not smoke  Nicotine and other chemicals in cigarettes and cigars can cause lung and heart damage  Ask your healthcare provider for information if you currently smoke and need help to quit  E-cigarettes or smokeless tobacco still contain nicotine  Talk to your healthcare provider before you use these products  · Exercise regularly  to help you maintain a healthy weight and improve your blood pressure and cholesterol levels  Ask your healthcare provider about the best exercise plan for you  Do not start an exercise program without asking your healthcare provider  Follow up with your healthcare provider as directed:  Write down your questions so you remember to ask them during your visits  © 2017 [employer]  Information is for End User's use only and may not be sold, redistributed or otherwise used for commercial purposes  All illustrations and images included in CareNotes® are the copyrighted property of A D [employer] M , Inc  or Josr Rosenberg  The above information is an  only  It is not intended as medical advice for individual conditions or treatments  Talk to your doctor, nurse or pharmacist before following any medical regimen to see if it is safe and effective for you

## 2019-08-08 NOTE — PROGRESS NOTES
ADULT ANNUAL PHYSICAL  [facility] - [id number]    NAME: Jt Plascencia  AGE: 45 y o  SEX: female  : 1975     DATE: 2019     Assessment and Plan:     Problem List Items Addressed This Visit        Digestive    GERD (gastroesophageal reflux disease)    Relevant Medications    omeprazole (PriLOSEC) 20 mg delayed release capsule       Respiratory    Asthma - Primary    Relevant Medications    albuterol (PROAIR HFA) 90 mcg/act inhaler    Allergic rhinitis    Relevant Medications    albuterol (PROAIR HFA) 90 mcg/act inhaler      Other Visit Diagnoses     Right acute serous otitis media, recurrence not specified        Relevant Medications    pseudoephedrine (SUDAFED) 30 mg tablet    Annual physical exam        BMI 29 0-29 9,adult        Well adult exam        Relevant Orders    Lipid panel    Comprehensive metabolic panel    CBC and differential    TSH, 3rd generation with Free T4 reflex          Immunizations and preventive care screenings were discussed with patient today  Appropriate education was printed on patient's after visit summary  Counseling:  · Depression Screening Follow-up Plan: Patient's depression screening was positive with a PHQ-2 score of   Their PHQ-9 score was   Patient assessed for underlying major depression  They have no active suicidal ideations  Brief counseling provided and recommend additional follow-up/re-evaluation next office visit  No follow-ups on file  Chief Complaint:     Chief Complaint   Patient presents with    Physical Exam     Yearly exam     Medication Refill     [facility] on file for pending refills  Would like to discuss a inhaler  History of Present Illness:     Adult Annual Physical   Patient here for a comprehensive physical exam  The patient reports no problems  Diet and Physical Activity  · Diet/Nutrition: well balanced diet  · Exercise: vigorous cardiovascular exercise        Depression Screening  PHQ-9 Depression Screening    PHQ-9:    Frequency of the following problems over the past two weeks:            General Health  · Sleep: sleeps well  · Hearing: normal - bilateral   · Vision: no vision problems  · Dental: regular dental visits  /GYN Health  · Patient is: premenopausal  · Last menstrual period: this month  · Contraceptive method: none  Review of Systems:     Review of Systems   Constitutional: Negative  HENT: Negative  Eyes: Negative  Respiratory: Negative  Cardiovascular: Negative  Gastrointestinal: Negative  Endocrine: Negative  Genitourinary: Negative  Musculoskeletal: Negative  Skin: Negative  Allergic/Immunologic: Negative  Neurological: Positive for headaches  Hematological: Negative  Psychiatric/Behavioral: Negative  All other systems reviewed and are negative       Past Medical History:     Past Medical History:   Diagnosis Date    Asthma       Past Surgical History:     Past Surgical History:   Procedure Laterality Date    KNEE SURGERY      TUBAL LIGATION        Social History:     Social History     Socioeconomic History    Marital status: Single     Spouse name: Not on file    Number of children: Not on file    Years of education: Not on file    Highest education level: Not on file   Occupational History    Not on file   Social Needs    Financial resource strain: Not on file    Food insecurity:     Worry: Not on file     Inability: Not on file    Transportation needs:     Medical: Not on file     Non-medical: Not on file   Tobacco Use    Smoking status: Never Smoker    Smokeless tobacco: Never Used   Substance and Sexual Activity    Alcohol use: Yes     Comment: DRINKING IN MODERATION (2 DRINKS / DAY OR FEWER)    Drug use: No    Sexual activity: Yes   Lifestyle    Physical activity:     Days per week: Not on file     Minutes per session: Not on file    Stress: Not on file   Relationships    Social connections:     Talks on phone: Not on file     Gets together: Not on file     Attends Zoroastrian service: Not on file     Active member of club or organization: Not on file     Attends meetings of clubs or organizations: Not on file     Relationship status: Not on file    Intimate partner violence:     Fear of current or ex partner: Not on file     Emotionally abused: Not on file     Physically abused: Not on file     Forced sexual activity: Not on file   Other Topics Concern    Not on file   Social History Narrative     AS PER [employer]    DENIED: HISTORY OF PHYSICAL ABUSE (HISTORY)      Family History:     Family History   Problem Relation Age of Onset    Diabetes Father     Breast cancer Maternal Grandmother     Asthma Family         CHRONIC    Thyroid disease Mother     Diabetes Paternal Grandmother         Lung CA    Heart disease Neg Hx       Current Medications:     Current Outpatient Medications   Medication Sig Dispense Refill    ARTIFICIAL TEARS 1 4 % ophthalmic solution ADMINISTER 1 DROP IN EACH EYE FOUR TIMES A DAY AS NEEDED FOR DRY EYES FOR UP TO 90 DAYS  5    cetirizine (ZyrTEC) 10 mg tablet Take 1 tablet (10 mg total) by mouth daily 30 tablet 2    fluticasone (FLONASE) 50 mcg/act nasal spray 1 spray into each nostril 2 (two) times a day 16 g 0    hypromellose (ARTIFICIAL TEARS) 0 4 % SOLN Administer 1 drop to both eyes 4 (four) times a day as needed for dry eyes for up to 90 days 1 Bottle 5    methocarbamol (ROBAXIN) 500 mg tablet Take 1 tablet (500 mg total) by mouth 2 (two) times a day for 10 days 20 tablet 0    naproxen (NAPROSYN) 500 mg tablet Take 1 tablet (500 mg total) by mouth 2 (two) times a day with meals for 30 days 60 tablet 0    omeprazole (PriLOSEC) 20 mg delayed release capsule Take 1 capsule (20 mg total) by mouth daily 90 capsule 3    pseudoephedrine (SUDAFED) 30 mg tablet Take 1 tablet (30 mg total) by mouth every 4 (four) hours as needed for congestion 30 tablet 1    albuterol (PROAIR HFA) 90 mcg/act inhaler Inhale 2 puffs every 6 (six) hours as needed for wheezing 1 Inhaler 0    valACYclovir (VALTREX) 1,000 mg tablet Take 2 tablets (2,000 mg total) by mouth as needed (fever blister) for up to 90 days 15 tablet 3     No current facility-administered medications for this visit  Allergies: Allergies   Allergen Reactions    Prednisone Facial Swelling      Physical Exam:     /72 (BP Location: Right arm)   Pulse 63   Ht 5' 6" (1 676 m)   Wt 83 kg (183 lb)   BMI 29 54 kg/m²     Physical Exam   Constitutional: She is oriented to person, place, and time  She appears well-developed and well-nourished  HENT:   Head: Normocephalic and atraumatic  Right Ear: External ear normal    Left Ear: External ear normal    Nose: Nose normal    Mouth/Throat: Oropharynx is clear and moist    Eyes: Pupils are equal, round, and reactive to light  Conjunctivae and EOM are normal    Neck: Normal range of motion  Neck supple  Cardiovascular: Normal rate, regular rhythm and normal heart sounds  Pulmonary/Chest: Effort normal and breath sounds normal    Abdominal: Soft  Bowel sounds are normal    Musculoskeletal: Normal range of motion  Neurological: She is alert and oriented to person, place, and time  She has normal reflexes  Skin: Skin is warm and dry  Psychiatric: She has a normal mood and affect  Her behavior is normal    Nursing note and vitals reviewed  Radha Bellamy DO  [address]  BMI Counseling: Body mass index is 29 54 kg/m²  Discussed the patient's BMI with her  The BMI is above average  BMI counseling and education was provided to the patient   Nutrition recommendations include reducing portion sizes, decreasing overall calorie intake, 3-5 servings of fruits/vegetables daily, reducing fast food intake, consuming healthier snacks, decreasing soda and/or juice intake, moderation in carbohydrate intake, increasing intake of lean protein, reducing intake of saturated fat and trans fat and reducing intake of cholesterol  Exercise recommendations include moderate aerobic physical activity for 150 minutes/week and exercising 3-5 times per week

## 2019-08-20 ENCOUNTER — APPOINTMENT (OUTPATIENT)
Dept: LAB | Facility: HOSPITAL | Age: 44
End: 2019-08-20
Payer: COMMERCIAL

## 2019-08-20 LAB
ALBUMIN SERPL BCP-MCNC: 3.6 G/DL (ref 3.5–5)
ALP SERPL-CCNC: 56 U/L (ref 46–116)
ALT SERPL W P-5'-P-CCNC: 22 U/L (ref 12–78)
ANION GAP SERPL CALCULATED.3IONS-SCNC: 9 MMOL/L (ref 4–13)
AST SERPL W P-5'-P-CCNC: 20 U/L (ref 5–45)
BASOPHILS # BLD AUTO: 0.02 THOUSANDS/ΜL (ref 0–0.1)
BASOPHILS NFR BLD AUTO: 0 % (ref 0–1)
BILIRUB SERPL-MCNC: 0.48 MG/DL (ref 0.2–1)
BUN SERPL-MCNC: 21 MG/DL (ref 5–25)
CALCIUM SERPL-MCNC: 8.7 MG/DL (ref 8.3–10.1)
CHLORIDE SERPL-SCNC: 104 MMOL/L (ref 100–108)
CHOLEST SERPL-MCNC: 196 MG/DL (ref 50–200)
CO2 SERPL-SCNC: 28 MMOL/L (ref 21–32)
CREAT SERPL-MCNC: 0.97 MG/DL (ref 0.6–1.3)
EOSINOPHIL # BLD AUTO: 0.16 THOUSAND/ΜL (ref 0–0.61)
EOSINOPHIL NFR BLD AUTO: 3 % (ref 0–6)
ERYTHROCYTE [DISTWIDTH] IN BLOOD BY AUTOMATED COUNT: 14.8 % (ref 11.6–15.1)
GFR SERPL CREATININE-BSD FRML MDRD: 72 ML/MIN/1.73SQ M
GLUCOSE P FAST SERPL-MCNC: 85 MG/DL (ref 65–99)
HCT VFR BLD AUTO: 39.3 % (ref 34.8–46.1)
HDLC SERPL-MCNC: 61 MG/DL (ref 40–60)
HGB BLD-MCNC: 12.3 G/DL (ref 11.5–15.4)
IMM GRANULOCYTES # BLD AUTO: 0.02 THOUSAND/UL (ref 0–0.2)
IMM GRANULOCYTES NFR BLD AUTO: 0 % (ref 0–2)
LDLC SERPL CALC-MCNC: 126 MG/DL (ref 0–100)
LYMPHOCYTES # BLD AUTO: 2.5 THOUSANDS/ΜL (ref 0.6–4.47)
LYMPHOCYTES NFR BLD AUTO: 40 % (ref 14–44)
MCH RBC QN AUTO: 26.1 PG (ref 26.8–34.3)
MCHC RBC AUTO-ENTMCNC: 31.3 G/DL (ref 31.4–37.4)
MCV RBC AUTO: 83 FL (ref 82–98)
MONOCYTES # BLD AUTO: 0.49 THOUSAND/ΜL (ref 0.17–1.22)
MONOCYTES NFR BLD AUTO: 8 % (ref 4–12)
NEUTROPHILS # BLD AUTO: 3.01 THOUSANDS/ΜL (ref 1.85–7.62)
NEUTS SEG NFR BLD AUTO: 49 % (ref 43–75)
NONHDLC SERPL-MCNC: 135 MG/DL
NRBC BLD AUTO-RTO: 0 /100 WBCS
PLATELET # BLD AUTO: 176 THOUSANDS/UL (ref 149–390)
POTASSIUM SERPL-SCNC: 3.9 MMOL/L (ref 3.5–5.3)
PROT SERPL-MCNC: 7.6 G/DL (ref 6.4–8.2)
RBC # BLD AUTO: 4.71 MILLION/UL (ref 3.81–5.12)
SODIUM SERPL-SCNC: 141 MMOL/L (ref 136–145)
TRIGL SERPL-MCNC: 44 MG/DL
TSH SERPL DL<=0.05 MIU/L-ACNC: 2.47 UIU/ML (ref 0.36–3.74)
WBC # BLD AUTO: 6.2 THOUSAND/UL (ref 4.31–10.16)

## 2019-08-20 PROCEDURE — 80053 COMPREHEN METABOLIC PANEL: CPT | Performed by: FAMILY MEDICINE

## 2019-08-20 PROCEDURE — 80061 LIPID PANEL: CPT | Performed by: FAMILY MEDICINE

## 2019-08-20 PROCEDURE — 84443 ASSAY THYROID STIM HORMONE: CPT | Performed by: FAMILY MEDICINE

## 2019-08-20 PROCEDURE — 36415 COLL VENOUS BLD VENIPUNCTURE: CPT | Performed by: FAMILY MEDICINE

## 2019-08-20 PROCEDURE — 85025 COMPLETE CBC W/AUTO DIFF WBC: CPT | Performed by: FAMILY MEDICINE

## 2020-01-01 NOTE — PROGRESS NOTES
[facility]        NAME: Vicki López is a 38 y o  female  : 1975    MRN: [id number]  DATE: May 13, 2018  TIME: 3:09 PM    Assessment and Plan   Adverse effect due to correct medicinal substance, properly given, initial encounter Jalen Clarke  1  Adverse effect due to correct medicinal substance, properly given, initial encounter           Patient Instructions       Follow up with PCP in 3-5 days  Proceed to  ER if symptoms worsen  Chief Complaint     Chief Complaint   Patient presents with    Rash     Pt with red rash to face for 1 week  States worsening past 2 days  Now itching and spreading to neck  Denies any different soap, lotion or detergent  Denies shortness of breath or difficulty swallowing  History of Present Illness       Patient complaining of facial and neck rash over the last 3 weeks  Rash is somewhat pruritic  However last 3 days it has gotten worse  Describes that 3 weeks ago she was treated by her primary care provider and completed a 6 day course of Medrol Dosepak at that time  Rash has been somewhat similar to find pimples  Denies any change in soap, detergent, fabric softener  No foreign travel  No new pets  Denies any difficulty swallowing or breathing at the present time  However in the last 3 days she has been outdoors wonders if this could be a heat rash  Rash has spared arms, torso, legs  Review of Systems   Review of Systems   Constitutional: Negative  Respiratory: Negative  Skin: Positive for rash           Current Medications       Current Outpatient Prescriptions:     diclofenac (VOLTAREN) 75 mg EC tablet, Take 1 tablet by mouth Twice daily, Disp: , Rfl:     fluticasone (FLONASE) 50 mcg/act nasal spray, 1 spray into each nostril 2 (two) times a day, Disp: 16 g, Rfl: 0    Methylprednisolone 4 MG TBPK, Use as directed on package, Disp: 21 tablet, Rfl: 0    omeprazole (PriLOSEC) 20 mg delayed release capsule, Take 1 capsule by mouth daily, Disp: , Rfl:     valACYclovir (VALTREX) 1,000 mg tablet, Take 2 tablets by mouth 2 (two) times a day, Disp: , Rfl:     Current Allergies     Allergies as of 05/13/2018    (No Known Allergies)            The following portions of the patient's history were reviewed and updated as appropriate: allergies, current medications, past family history, past medical history, past social history, past surgical history and problem list      Past Medical History:   Diagnosis Date    Asthma        No past surgical history on file  No family history on file  Medications have been verified  Objective   /70 (BP Location: Left arm, Patient Position: Sitting, Cuff Size: Standard)   Pulse 66   Temp 97 8 °F (36 6 °C) (Tympanic)   Resp 20   SpO2 100%        Physical Exam     Physical Exam   Constitutional: She appears well-developed and well-nourished  HENT:   Mouth/Throat: Oropharynx is clear and moist    Pulmonary/Chest: Effort normal and breath sounds normal    Skin: Skin is warm  Rash noted  Face-fine maculopapular lesions some pustule lesion around the angle of the jaw in upper lip  Findings consistent with acute break out of acne most likely secondary affect from [address] note and vitals reviewed  2020

## 2020-11-23 NOTE — LETTER
October 29, 2018     Claudia Burgess, [address] [address]    Patient: Esther Rodriguez   YOB: 1975   Date of Visit: 10/29/2018       Dear Dr Enrique Pineda: Thank you for referring Esther Rodriguez to me for evaluation  Below are my notes for this consultation  If you have questions, please do not hesitate to call me  I look forward to following your patient along with you  Sincerely,        Gunnar Calderon        CC: No Recipients  Gunnar Calderon  10/29/2018 11:08 AM  Sign at close encounter  Ortho Sports Medicine Shoulder Visit     Assesment:     right shoulder impingement syndrome and bursitis    Plan:    Conservative treatment:    Ice to shoulder 1-2 times daily, for 20 minutes at a time  PT for ROM and strengthening to shoulder, rotator cuff, scapular stabilizers  Let pain guide return to activities  She will follow up in 8 weeks for re evaluation of her RIGHT shoulder  If symptoms persist, will order MRI of shoulder to evaluate labrum and rotator cuff  Imaging: All imaging from today was reviewed by myself and explained to the patient  Injection:    No Injection planned at this time  Surgery:     No surgery is recommended at this point, continue with conservative treatment plan as noted  History of Present Illness: The patient is a 41 y o , right hand dominant female whose occupation is a  at [employer], referred to me by themself, seen in clinic for evaluation of right shoulder pain  The patient denies a history of diabetes  The patient denies a history of thyroid disorder  Pain is located anterior, lateral   The patient rates the pain as a 5/10  The pain has been present for 6 days  The patient sustained an injury on 10/23/2018  Patient stated that the mechanism of injury was typing for a prolonged period of time at work  The pain is characterized as sharp, stabbing    The pain is present at Pt informed   all times  Pain is improved by rest   Pain is aggravated by overhead activity, reaching back, rotation and lifting   Symptoms include clicking and cracking  The patient denies weakness  The patient denies numbness and tingling  The patient has tried rest and NSAIDS  Shoulder Surgical History:  None    Past Medical, Social and Family History:  Past Medical History:   Diagnosis Date    Asthma      Past Surgical History:   Procedure Laterality Date    KNEE SURGERY      TUBAL LIGATION       Allergies   Allergen Reactions    Prednisone Facial Swelling     Current Outpatient Prescriptions on File Prior to Visit   Medication Sig Dispense Refill    cetirizine (ZyrTEC) 10 mg tablet Take 1 tablet (10 mg total) by mouth daily 90 tablet 0    diclofenac (VOLTAREN) 75 mg EC tablet Take 1 tablet by mouth Twice daily      fluticasone (FLONASE) 50 mcg/act nasal spray 1 spray into each nostril 2 (two) times a day 16 g 0    methocarbamol (ROBAXIN) 500 mg tablet Take 1 tablet (500 mg total) by mouth 3 (three) times a day 90 tablet 0    Methylprednisolone 4 MG TBPK Use as directed on package (Patient not taking: Reported on 10/29/2018 ) 21 tablet 0    omeprazole (PriLOSEC) 20 mg delayed release capsule Take 1 capsule by mouth daily      valACYclovir (VALTREX) 1,000 mg tablet Take 2 tablets by mouth 2 (two) times a day       No current facility-administered medications on file prior to visit  Social History     Social History    Marital status: Single     Spouse name: N/A    Number of children: N/A    Years of education: N/A     Occupational History    Not on file       Social History Main Topics    Smoking status: Never Smoker    Smokeless tobacco: Never Used    Alcohol use Yes      Comment: DRINKING IN MODERATION (2 DRINKS / DAY OR FEWER)    Drug use: No    Sexual activity: Not Currently     Other Topics Concern    Not on file     Social History Narrative     AS PER [employer] DENIED: HISTORY OF PHYSICAL ABUSE (HISTORY)         I have reviewed the past medical, surgical, social and family history, medications and allergies as documented in the EMR  Review of systems: ROS is negative other than that noted in the HPI  Constitutional: Negative for fatigue and fever  HENT: Negative for sore throat  Respiratory: Negative for shortness of breath  Cardiovascular: Negative for chest pain  Gastrointestinal: Negative for abdominal pain  Endocrine: Negative for cold intolerance and heat intolerance  Genitourinary: Negative for flank pain  Musculoskeletal: Negative for back pain  Skin: Negative for rash  Allergic/Immunologic: Negative for immunocompromised state  Neurological: Negative for dizziness  Psychiatric/Behavioral: Negative for agitation  Physical Exam:    Blood pressure 131/84, pulse 63, height 5' 6" (1 676 m), weight 86 2 kg (190 lb), not currently breastfeeding  General/Constitutional: NAD, well developed, well nourished  HENT: Normocephalic, atraumatic  CV: Intact distal pulses, regular rate  Resp: No respiratory distress or labored breathing  Lymphatic: No lymphadenopathy palpated  Neuro: Alert and Oriented x 3, no focal deficits  Psych: Normal mood, normal affect, normal judgement, normal behavior  Skin: Warm, dry, no rashes, no erythema     Shoulder Exam (focused):     Shoulder focused exam:       RIGHT LEFT    Scapula Atrophy Negative Negative     Winging Negative Negative     Protraction Negative Negative    Rotator cuff SS 5/5 5/5     IS 5/5 5/5     SubS 5/5 5/5    ROM  170     ER0 60 60     ER90 90 90     IR90 40 40     IRb T10 T6    TTP: AC Positive Negative     Biceps Negative Negative     Coracoid Negative Negative    Special Tests: O'Briens Positive Negative     Kurtz-shear Positive Negative     Cross body Adduction Negative Negative     Speeds  Negative Negative     Jens's Negative Negative     Whipple Negative Negative       Neer Negative Negative     Quinones Negative Negative    Instability: Apprehension & relocation not tested not tested     Load & shift not tested not tested    Other: Crank Negative Negative               UE NV Exam: +2 Radial pulses bilaterally  Sensation intact to light touch C5-T1 bilaterally, Radial/median/ulnar nerve motor intact      Bilateral elbow, wrist, and and forearm ROM full, painless with passive ROM, no ttp or crepitance throughout extremities below shoulder joint    Cervical ROM is full without pain, numbness or tingling      Shoulder Imaging    X-rays of the right shoulder were reviewed, which demonstrate Mild Glenohumeral and AC joint Arthritis   I have reviewed the radiology report and do not currently have a radiology reading from [facility], but will check the result once the reading is performed